# Patient Record
Sex: MALE | Race: WHITE | Employment: OTHER | URBAN - NONMETROPOLITAN AREA
[De-identification: names, ages, dates, MRNs, and addresses within clinical notes are randomized per-mention and may not be internally consistent; named-entity substitution may affect disease eponyms.]

---

## 2017-10-14 ENCOUNTER — OFFICE VISIT (OUTPATIENT)
Dept: PRIMARY CARE CLINIC | Age: 52
End: 2017-10-14
Payer: MEDICARE

## 2017-10-14 VITALS
OXYGEN SATURATION: 97 % | DIASTOLIC BLOOD PRESSURE: 90 MMHG | WEIGHT: 315 LBS | RESPIRATION RATE: 16 BRPM | HEART RATE: 100 BPM | BODY MASS INDEX: 51.16 KG/M2 | TEMPERATURE: 98.5 F | SYSTOLIC BLOOD PRESSURE: 150 MMHG

## 2017-10-14 DIAGNOSIS — H66.002 ACUTE SUPPURATIVE OTITIS MEDIA OF LEFT EAR WITHOUT SPONTANEOUS RUPTURE OF TYMPANIC MEMBRANE, RECURRENCE NOT SPECIFIED: Primary | ICD-10-CM

## 2017-10-14 DIAGNOSIS — H60.393 OTHER INFECTIVE ACUTE OTITIS EXTERNA OF BOTH EARS: ICD-10-CM

## 2017-10-14 PROCEDURE — G8419 CALC BMI OUT NRM PARAM NOF/U: HCPCS | Performed by: NURSE PRACTITIONER

## 2017-10-14 PROCEDURE — 4130F TOPICAL PREP RX AOE: CPT | Performed by: NURSE PRACTITIONER

## 2017-10-14 PROCEDURE — G8427 DOCREV CUR MEDS BY ELIG CLIN: HCPCS | Performed by: NURSE PRACTITIONER

## 2017-10-14 PROCEDURE — 3017F COLORECTAL CA SCREEN DOC REV: CPT | Performed by: NURSE PRACTITIONER

## 2017-10-14 PROCEDURE — 4004F PT TOBACCO SCREEN RCVD TLK: CPT | Performed by: NURSE PRACTITIONER

## 2017-10-14 PROCEDURE — G8484 FLU IMMUNIZE NO ADMIN: HCPCS | Performed by: NURSE PRACTITIONER

## 2017-10-14 PROCEDURE — 99213 OFFICE O/P EST LOW 20 MIN: CPT | Performed by: NURSE PRACTITIONER

## 2017-10-14 RX ORDER — FENOFIBRATE 145 MG/1
TABLET, COATED ORAL
Refills: 3 | COMMUNITY
Start: 2017-10-04

## 2017-10-14 RX ORDER — TIZANIDINE 4 MG/1
TABLET ORAL
Refills: 3 | COMMUNITY
Start: 2017-10-04 | End: 2021-08-20 | Stop reason: ALTCHOICE

## 2017-10-14 RX ORDER — TRAMADOL HYDROCHLORIDE 50 MG/1
TABLET ORAL
Refills: 2 | COMMUNITY
Start: 2017-10-05

## 2017-10-14 RX ORDER — GABAPENTIN 100 MG/1
200 CAPSULE ORAL
COMMUNITY

## 2017-10-14 RX ORDER — TAMSULOSIN HYDROCHLORIDE 0.4 MG/1
CAPSULE ORAL
Refills: 5 | COMMUNITY
Start: 2017-10-04 | End: 2021-08-20 | Stop reason: ALTCHOICE

## 2017-10-14 RX ORDER — CYCLOBENZAPRINE HCL 5 MG
5 TABLET ORAL
COMMUNITY
Start: 2015-10-15 | End: 2021-08-20 | Stop reason: ALTCHOICE

## 2017-10-14 RX ORDER — TIOTROPIUM BROMIDE INHALATION SPRAY 3.12 UG/1
SPRAY, METERED RESPIRATORY (INHALATION)
Refills: 2 | COMMUNITY
Start: 2017-09-07

## 2017-10-14 RX ORDER — BUPROPION HYDROCHLORIDE 150 MG/1
150 TABLET, EXTENDED RELEASE ORAL
COMMUNITY

## 2017-10-14 RX ORDER — BUDESONIDE AND FORMOTEROL FUMARATE DIHYDRATE 160; 4.5 UG/1; UG/1
2 AEROSOL RESPIRATORY (INHALATION)
COMMUNITY

## 2017-10-14 RX ORDER — DOXYCYCLINE 100 MG/1
100 CAPSULE ORAL 2 TIMES DAILY
Qty: 14 CAPSULE | Refills: 0 | Status: SHIPPED | OUTPATIENT
Start: 2017-10-14 | End: 2017-10-21

## 2017-10-14 RX ORDER — OXYCODONE HYDROCHLORIDE AND ACETAMINOPHEN 5; 325 MG/1; MG/1
TABLET ORAL
Refills: 0 | COMMUNITY
Start: 2017-09-20

## 2017-10-14 RX ORDER — PREDNISONE 20 MG/1
TABLET ORAL
Refills: 0 | COMMUNITY
Start: 2017-09-25 | End: 2021-08-20 | Stop reason: ALTCHOICE

## 2017-10-14 RX ORDER — OFLOXACIN 3 MG/ML
10 SOLUTION AURICULAR (OTIC) 2 TIMES DAILY
Qty: 10 ML | Refills: 0 | Status: SHIPPED | OUTPATIENT
Start: 2017-10-14 | End: 2017-10-21

## 2017-10-14 ASSESSMENT — ENCOUNTER SYMPTOMS
SHORTNESS OF BREATH: 1
DIARRHEA: 0
SORE THROAT: 1
WHEEZING: 1
VOMITING: 0
COUGH: 1
RHINORRHEA: 1

## 2017-10-14 NOTE — PROGRESS NOTES
[address] [facility] WALK-IN [facility] [place] [id number]  Dept: [phone]  Dept Fax: [phone]    Karn Mohs is a 46 y.o. male who presents to the [facility] in Care today for his medical conditions/complaints as noted below. Karn Mohs is c/o of Otalgia and Cough      HPI:     Otalgia    There is pain in the left ear. This is a new problem. The current episode started yesterday. The problem occurs constantly. The problem has been gradually worsening. There has been no fever. The pain is at a severity of 8/10. The pain is moderate. Associated symptoms include coughing, headaches, rhinorrhea and a sore throat. Pertinent negatives include no diarrhea or vomiting. He has tried nothing for the symptoms. The treatment provided no relief. His past medical history is significant for hearing loss (little). There is no history of a chronic ear infection or a tympanostomy tube. Cough   This is a new problem. The problem has been gradually worsening. The cough is productive of sputum (thick yellow). Associated symptoms include ear pain, headaches, nasal congestion, rhinorrhea, a sore throat, shortness of breath, sweats and wheezing. Pertinent negatives include no chills or fever. Nothing aggravates the symptoms. He has tried a beta-agonist inhaler for the symptoms. His past medical history is significant for asthma, bronchitis and COPD. There is no history of bronchiectasis, emphysema, environmental allergies or pneumonia.        Past Medical History:   Diagnosis Date    ASHANTI (acute kidney injury) ([facility] [address] 5/20/2014    COPD (chronic obstructive pulmonary disease) ([facility] 5/26/2014        Current Outpatient Prescriptions   Medication Sig Dispense Refill    budesonide-formoterol (SYMBICORT) 160-4.5 MCG/ACT AERO Inhale 2 puffs into the lungs      buPROPion (WELLBUTRIN SR) 150 MG extended release tablet Take 150 mg by mouth      cyclobenzaprine (FLEXERIL) 5 MG tablet Take 5 mg by mouth      gabapentin (NEURONTIN) 100 MG capsule Take 200 mg by mouth      fenofibrate (TRICOR) 145 MG tablet TAKE 1 TABLET BY MOUTH EVERY DAY  3    metFORMIN (GLUCOPHAGE) 500 MG tablet TAKE 1 TABLET BY MOUTH TWICE DAILY WITH MEALS  5    oxyCODONE-acetaminophen (PERCOCET) 5-325 MG per tablet TAKE 1 TABLET BY MOUTH TWICE DAILY AS NEEDED.  0    predniSONE (DELTASONE) 20 MG tablet TAKE 3 TABLETS BY MOUTH EVERY DAY FOR 5 DAYS  0    tamsulosin (FLOMAX) 0.4 MG capsule TAKE 2 CAPSULES BY MOUTH EVERY DAY  5    SPIRIVA RESPIMAT 2.5 MCG/ACT AERS inhaler INHALE 2 (TWO) puffs ONCE DAILY  2    tiZANidine (ZANAFLEX) 4 MG tablet take 2 tablets in the morning,1 tablet at noon, and 2 tablets at bedtime  3    traMADol (ULTRAM) 50 MG tablet TAKE 1 TO 2 TABLETS BY MOUTH THREE TIMES DAILY AS NEEDED FOR PAIN.  2    ofloxacin (FLOXIN) 0.3 % otic solution Place 10 drops into both ears 2 times daily for 7 days 10 mL 0    doxycycline monohydrate (MONODOX) 100 MG capsule Take 1 capsule by mouth 2 times daily for 7 days 14 capsule 0    albuterol (PROVENTIL HFA) 108 (90 BASE) MCG/ACT inhaler Inhale 2 puffs into the lungs every 6 hours as needed for Wheezing. 1 Inhaler 1    aspirin 81 MG EC tablet Take 1 tablet by mouth daily. 30 tablet 3    amiodarone (CORDARONE) 200 MG tablet Take 1 tablet by mouth daily. 30 tablet 3    insulin glargine (LANTUS) 100 UNIT/ML injection Inject 5 Units into the skin nightly. 1 Pen 2    insulin lispro (HUMALOG) 100 UNIT/ML injection Inject 0-18 Units into the skin 3 times daily (with meals). 1 Pen 2    insulin lispro (HUMALOG) 100 UNIT/ML injection Inject 0-9 Units into the skin nightly. 1 Pen 2    atorvastatin (LIPITOR) 40 MG tablet Take 1 tablet by mouth nightly. 30 tablet 3    lisinopril (PRINIVIL;ZESTRIL) 20 MG tablet Take 1 tablet by mouth daily. 30 tablet 2    metoprolol (LOPRESSOR) 100 MG tablet Take 1 tablet by mouth 2 times daily.  60 tablet 2    furosemide (LASIX) 40 MG tablet Take 1 tablet by mouth daily. 60 tablet 2    amiodarone (PACERONE) 400 MG tablet Take 1 tablet by mouth 2 times daily for 3 days. 2 tablet 0    amiodarone (PACERONE) 400 MG tablet Take 1 tablet by mouth daily for 3 days. 3 tablet 0    rivaroxaban (XARELTO) 15 MG TABS tablet Take 1 tablet by mouth 2 times daily (with meals) for 21 days. 32 tablet 0    rivaroxaban (XARELTO) 20 MG TABS tablet Take 1 tablet by mouth Daily for 68 days. 68 tablet 0     No current facility-administered medications for this visit. Allergies   Allergen Reactions    Penicillins Hives    Vancomycin Rash     Had rash after many days of vanco, but was also on pain meds, rash was mild and macular over chest and all the back, noted afternoon 5/28/14 and vanco switched and pt left to NH- pain meds were not addressed       Subjective:      Review of Systems   Constitutional: Positive for diaphoresis. Negative for appetite change, chills, fatigue and fever. HENT: Positive for congestion, ear pain, rhinorrhea and sore throat. Respiratory: Positive for cough, shortness of breath and wheezing. Gastrointestinal: Negative for diarrhea and vomiting. Allergic/Immunologic: Negative for environmental allergies. Neurological: Positive for headaches. Objective:     Physical Exam   Constitutional: He is oriented to person, place, and time. He appears well-developed and well-nourished. He is cooperative. He does not appear ill. No distress. HENT:   Head: Normocephalic and atraumatic. Right Ear: Hearing and ear canal normal. There is swelling (mild edema with erythema to external auditory canal) and tenderness. No drainage. No mastoid tenderness. Tympanic membrane is not injected, not erythematous and not bulging. No middle ear effusion. Left Ear: Hearing and ear canal normal. There is swelling (mild edema with erythema and scant white debris to external auditory canal) and tenderness. No drainage. No mastoid tenderness.  Tympanic difficulty breathing, shortness of breath, inability to swallow, hives, rash, facial/tongue swelling or temp greater than 103 degrees. · Follow up as needed with PCP or Walk in Care if symptoms worsen or do not improve      Tigist Blunt received counseling on the following healthy behaviors: medication adherence. Patient given educational materials - see patient instructions. Discussed use, benefit, and side effects of prescribed medications. Treatment plan discussed at visit. Continue routine health care follow up. All patient questions answered. Pt voiced understanding.       Electronically signed by Malena Bonilla CNP on 10/14/2017 at 9:57 AM

## 2017-10-14 NOTE — PATIENT INSTRUCTIONS
doxycycline  Pronunciation:  DOX ray azul  Brand:  Acticlate, Adoxa, Alodox, Avidoxy, Doryx, Mondoxyne NL, Monodox, Morgidox, Genuine Parts, [place], Gaona, Targadox, Vibramycin  What is the most important information I should know about doxycycline? You should not take this medicine if you are allergic to any tetracycline antibiotic. Children younger than 6years old should use doxycycline only in cases of severe or life-threatening conditions. This medicine can cause permanent yellowing or graying of the teeth in children  Using doxycycline during pregnancy could harm the unborn baby or cause permanent tooth discoloration later in the baby's life. What is doxycycline? Doxycycline is a tetracycline antibiotic that fights bacteria in the body. Doxycycline is used to treat many different bacterial infections, such as acne, urinary tract infections, intestinal infections, eye infections, gonorrhea, chlamydia, periodontitis (gum disease), and others. Doxycycline is also used to treat blemishes, bumps, and acne-like lesions caused by rosacea. Doxycycline will not treat facial redness caused by rosacea. Some forms of doxycycline are used to prevent malaria, to treat anthrax, or to treat infections caused by mites, ticks, or lice. Doxycycline may also be used for purposes not listed in this medication guide. What should I discuss with my healthcare provider before taking doxycycline? You should not take this medicine if you are allergic to doxycycline or other tetracycline antibiotics such as demeclocycline, minocycline, tetracycline, or tigecycline.   To make sure doxycycline is safe for you, tell your doctor if you have:  · liver disease;  · kidney disease;  · asthma or sulfite allergy;  · a history of increased pressure inside your skull;  · if you also take isotretinoin (Amnesteem, Claravis, Sotret); or  · if you take seizure medicine (carbamazepine, phenobarbital, phenytoin), or a blood others may occur. Call your doctor for medical advice about side effects. You may report side effects to FDA at 8-208-FSX-0005. What other drugs will affect doxycycline? Other drugs may interact with doxycycline, including prescription and over-the-counter medicines, vitamins, and herbal products. Tell each of your health care providers about all medicines you use now and any medicine you start or stop using. Where can I get more information? Your pharmacist can provide more information about doxycycline. Remember, keep this and all other medicines out of the reach of children, never share your medicines with others, and use this medication only for the indication prescribed. Every effort has been made to ensure that the information provided by [address] is accurate, up-to-date, and complete, but no guarantee is made to that effect. Drug information contained herein may be time sensitive. [employer] information has been compiled for use by healthcare practitioners and consumers in the [place] and therefore [employer] does not warrant that uses outside of the [place] are appropriate, unless specifically indicated otherwise. [facility] drug information does not endorse drugs, diagnose patients or recommend therapy. [facility] [employer] drug information is an informational resource designed to assist licensed healthcare practitioners in caring for their patients and/or to serve consumers viewing this service as a supplement to, and not a substitute for, the expertise, skill, knowledge and judgment of healthcare practitioners. The absence of a warning for a given drug or drug combination in no way should be construed to indicate that the drug or drug combination is safe, effective or appropriate for any given patient. [employer] does not assume any responsibility for any aspect of healthcare administered with the aid of information [facility] provides.  The information contained herein is not intended to cover away from moisture, heat, and light. Throw away any unused medicine after your treatment is finished. What happens if I miss a dose? Use the missed dose as soon as you remember. Skip the missed dose if it is almost time for your next scheduled dose. Do not use extra medicine to make up the missed dose. What happens if I overdose? An overdose of this medicine is not expected to be dangerous. Seek emergency medical attention or call the Poison Help line at 1-588.333.3460 if anyone has accidentally swallowed the medication. What should I avoid while taking ofloxacin otic? This medicine is for use only in the ears. Avoid getting the medicine in your eyes, mouth, and nose, or on your lips. Rinse with water if this medicine gets in or on these areas. Do not use other ear medications unless your doctor tells you to. What are the possible side effects of ofloxacin otic? Get emergency medical help if you have any of these signs of an allergic reaction: hives, rash, itching; slow heart rate, weak pulse, fainting; difficult breathing, slow breathing (breathing may stop); swelling of your face, lips, tongue, or throat. Stop using this medicine and call your doctor at once if you have:  · the first sign of any skin rash, no matter how mild; or  · ear drainage, discharge, or worsening pain. Common side effects may include:  · headache;  · dizziness; or  · mild ear pain or itching after using the ear drops. This is not a complete list of side effects and others may occur. Call your doctor for medical advice about side effects. You may report side effects to FDA at 2-383-VFA-9056. What other drugs will affect ofloxacin otic? It is not likely that other drugs you take orally or inject will have an effect on ofloxacin used in the ears. But many drugs can interact with each other.  Tell each of your healthcare providers about all medicines you use, including prescription and over-the-counter medicines, vitamins, and herbal products. Where can I get more information? Your pharmacist can provide more information about ofloxacin otic. Remember, keep this and all other medicines out of the reach of children, never share your medicines with others, and use this medication only for the indication prescribed. Every effort has been made to ensure that the information provided by [facility] is accurate, up-to-date, and complete, but no guarantee is made to that effect. Drug information contained herein may be time sensitive. [facility] information has been compiled for use by healthcare practitioners and consumers in the [place] and therefore [facility] does not warrant that uses outside of the [place] are appropriate, unless specifically indicated otherwise. [facility]'s drug information does not endorse drugs, diagnose patients or recommend therapy. [facility]'s drug information is an informational resource designed to assist licensed healthcare practitioners in caring for their patients and/or to serve consumers viewing this service as a supplement to, and not a substitute for, the expertise, skill, knowledge and judgment of healthcare practitioners. The absence of a warning for a given drug or drug combination in no way should be construed to indicate that the drug or drug combination is safe, effective or appropriate for any given patient. [facility] does not assume any responsibility for any aspect of healthcare administered with the aid of information [facility] provides. The information contained herein is not intended to cover all possible uses, directions, precautions, warnings, drug interactions, allergic reactions, or adverse effects. If you have questions about the drugs you are taking, check with your doctor, nurse or pharmacist.  Copyright 7797-5542 Ana Cristina [address] [place] [address] Avenue: 2.01. Revision date: 6/6/2014. Care instructions adapted under license by [facility] ([facility]).  If you have questions about a medical condition or this instruction, always ask your healthcare professional. [address] any warranty or liability for your use of this information. Swimmer's Ear: Care Instructions  Your Care Instructions    Swimmer's ear (otitis externa) is inflammation or infection of the ear canal. This is the passage that leads from the outer ear to the eardrum. Any water, sand, or other debris that gets into the ear canal and stays there can cause swimmer's ear. Putting cotton swabs or other items in the ear to clean it can also cause this problem. Swimmer's ear can be very painful. But you can treat the pain and infection with medicines. You should feel better in a few days. Follow-up care is a key part of your treatment and safety. Be sure to make and go to all appointments, and call your doctor if you are having problems. It's also a good idea to know your test results and keep a list of the medicines you take. How can you care for yourself at home? Cleaning and care  · Use antibiotic drops as your doctor directs. · Do not insert ear drops (other than the antibiotic ear drops) or anything else into the ear unless your doctor has told you to. · Avoid getting water in the ear until the problem clears up. Use cotton lightly coated with petroleum jelly as an earplug. Do not use plastic earplugs. · Use a hair dryer set on low to carefully dry the ear after you shower. · To ease ear pain, hold a warm washcloth against your ear. · Take pain medicines exactly as directed. ¨ If the doctor gave you a prescription medicine for pain, take it as prescribed. ¨ If you are not taking a prescription pain medicine, ask your doctor if you can take an over-the-counter medicine. Inserting ear drops  · Warm the drops to body temperature by rolling the container in your hands. Or you can place it in a cup of warm water for a few minutes. · Lie down, with your ear facing up. · Place drops inside the ear. problems. It's also a good idea to know your test results and keep a list of the medicines you take. How can you care for yourself at home? · Take pain medicines exactly as directed. ¨ If the doctor gave you a prescription medicine for pain, take it as prescribed. ¨ If you are not taking a prescription pain medicine, take an over-the-counter medicine, such as acetaminophen (Tylenol), ibuprofen (Advil, Motrin), or naproxen (Aleve). Read and follow all instructions on the label. ¨ Do not take two or more pain medicines at the same time unless the doctor told you to. Many pain medicines have acetaminophen, which is Tylenol. Too much acetaminophen (Tylenol) can be harmful. · Plan to take a full dose of pain reliever before bedtime. Getting enough sleep will help you get better. · Try a warm, moist washcloth on the ear. It may help relieve pain. · If your doctor prescribed antibiotics, take them as directed. Do not stop taking them just because you feel better. You need to take the full course of antibiotics. When should you call for help? Call your doctor now or seek immediate medical care if:  · You have new or increasing ear pain. · You have new or increasing pus or blood draining from your ear. · You have a fever with a stiff neck or a severe headache. Watch closely for changes in your health, and be sure to contact your doctor if:  · You have new or worse symptoms. · You are not getting better after taking an antibiotic for 2 days. Where can you learn more? Go to [email or web address].[email or web address] org and sign in to your [employer] account. Enter V427 in the [employer] box to learn more about \"Ear Infection (Otitis Media): Care Instructions. \"     If you do not have an account, please click on the \"Sign Up Now\" link. Current as of: May 4, 2017  Content Version: 11.3  © 2329-7718 [employer]. Care instructions adapted under license by [facility] ([facility]).  If you have questions about

## 2019-04-29 RX ORDER — FUROSEMIDE 20 MG/1
TABLET ORAL
Qty: 30 TABLET | OUTPATIENT
Start: 2019-04-29

## 2021-08-20 ENCOUNTER — HOSPITAL ENCOUNTER (EMERGENCY)
Age: 56
Discharge: HOME | End: 2021-08-20
Attending: FAMILY MEDICINE
Payer: MEDICARE

## 2021-08-20 ENCOUNTER — APPOINTMENT (OUTPATIENT)
Dept: GENERAL RADIOLOGY | Age: 56
End: 2021-08-20
Payer: MEDICARE

## 2021-08-20 VITALS
HEIGHT: 73 IN | OXYGEN SATURATION: 95 % | DIASTOLIC BLOOD PRESSURE: 96 MMHG | HEART RATE: 91 BPM | SYSTOLIC BLOOD PRESSURE: 141 MMHG | WEIGHT: 310 LBS | RESPIRATION RATE: 22 BRPM | BODY MASS INDEX: 41.08 KG/M2

## 2021-08-20 DIAGNOSIS — T07.XXXA ABRASIONS OF MULTIPLE SITES: ICD-10-CM

## 2021-08-20 DIAGNOSIS — M25.511 ACUTE PAIN OF RIGHT SHOULDER: Primary | ICD-10-CM

## 2021-08-20 DIAGNOSIS — M25.521 RIGHT ELBOW PAIN: ICD-10-CM
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
## 2021-08-20 PROCEDURE — 73030 X-RAY EXAM OF SHOULDER: CPT

## 2021-08-20 PROCEDURE — 73080 X-RAY EXAM OF ELBOW: CPT

## 2021-08-20 PROCEDURE — 6370000000 HC RX 637 (ALT 250 FOR IP): Performed by: FAMILY MEDICINE

## 2021-08-20 PROCEDURE — 99285 EMERGENCY DEPT VISIT HI MDM: CPT

## 2021-08-20 RX ORDER — LIDOCAINE 4 G/G
1 PATCH TOPICAL ONCE
Status: DISCONTINUED | OUTPATIENT
Start: 2021-08-20 | End: 2021-08-20 | Stop reason: HOSPADM

## 2021-08-20 RX ORDER — OXYCODONE HYDROCHLORIDE 5 MG/1
5 TABLET ORAL ONCE
Status: COMPLETED | OUTPATIENT
Start: 2021-08-20 | End: 2021-08-20

## 2021-08-20 RX ORDER — CYCLOBENZAPRINE HCL 10 MG
10 TABLET ORAL 3 TIMES DAILY PRN
Qty: 21 TABLET | Refills: 0 | Status: SHIPPED | OUTPATIENT
Start: 2021-08-20 | End: 2021-08-30

## 2021-08-20 RX ORDER — OXYCODONE HYDROCHLORIDE AND ACETAMINOPHEN 5; 325 MG/1; MG/1
1 TABLET ORAL EVERY 6 HOURS PRN
Qty: 12 TABLET | Refills: 0 | Status: SHIPPED | OUTPATIENT
Start: 2021-08-20 | End: 2021-08-23

## 2021-08-20 RX ORDER — ACETAMINOPHEN 500 MG
1000 TABLET ORAL ONCE
Status: COMPLETED | OUTPATIENT
Start: 2021-08-20 | End: 2021-08-20

## 2021-08-20 RX ORDER — HYDROCODONE BITARTRATE AND ACETAMINOPHEN 5; 325 MG/1; MG/1
1 TABLET ORAL ONCE
Status: DISCONTINUED | OUTPATIENT
Start: 2021-08-20 | End: 2021-08-20

## 2021-08-20 RX ADMIN — ACETAMINOPHEN 1000 MG: 500 TABLET, FILM COATED ORAL at 17:13

## 2021-08-20 RX ADMIN — OXYCODONE HYDROCHLORIDE 5 MG: 5 TABLET ORAL at 18:23

## 2021-08-20 ASSESSMENT — PAIN SCALES - GENERAL
PAINLEVEL_OUTOF10: 6
PAINLEVEL_OUTOF10: 5
PAINLEVEL_OUTOF10: 5

## 2021-08-20 ASSESSMENT — PAIN DESCRIPTION - PROGRESSION
CLINICAL_PROGRESSION: GRADUALLY IMPROVING
CLINICAL_PROGRESSION: NOT CHANGED

## 2021-08-20 ASSESSMENT — PAIN DESCRIPTION - PAIN TYPE
TYPE: ACUTE PAIN
TYPE: ACUTE PAIN

## 2021-08-20 ASSESSMENT — PAIN DESCRIPTION - LOCATION
LOCATION: SHOULDER
LOCATION: SHOULDER

## 2021-08-20 ASSESSMENT — PAIN DESCRIPTION - DESCRIPTORS
DESCRIPTORS: THROBBING
DESCRIPTORS: ACHING

## 2021-08-20 ASSESSMENT — PAIN DESCRIPTION - ORIENTATION
ORIENTATION: RIGHT
ORIENTATION: RIGHT

## 2021-08-20 ASSESSMENT — PAIN DESCRIPTION - ONSET
ONSET: SUDDEN
ONSET: SUDDEN

## 2021-08-20 ASSESSMENT — PAIN DESCRIPTION - FREQUENCY
FREQUENCY: CONTINUOUS
FREQUENCY: CONTINUOUS

## 2021-08-20 NOTE — ED NOTES
Patient refused Gage Kayser, states would like to have acetaminophen instead.  Will notify Dr. Pramod Jameson RN  08/20/21 0828
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
## 2022-12-24 ENCOUNTER — HOSPITAL ENCOUNTER (EMERGENCY)
Age: 57
Discharge: HOME | End: 2022-12-24
Attending: FAMILY MEDICINE
Payer: COMMERCIAL

## 2022-12-24 VITALS
DIASTOLIC BLOOD PRESSURE: 83 MMHG | OXYGEN SATURATION: 95 % | TEMPERATURE: 97.9 F | RESPIRATION RATE: 20 BRPM | SYSTOLIC BLOOD PRESSURE: 121 MMHG | BODY MASS INDEX: 28.44 KG/M2 | HEIGHT: 72 IN | WEIGHT: 210 LBS | HEART RATE: 117 BPM

## 2022-12-24 DIAGNOSIS — K59.00 CONSTIPATION, UNSPECIFIED CONSTIPATION TYPE: Primary | ICD-10-CM

## 2022-12-24 LAB
CHP ED QC CHECK: YES
HEMOCCULT STL QL: POSITIVE

## 2022-12-24 PROCEDURE — 99283 EMERGENCY DEPT VISIT LOW MDM: CPT

## 2022-12-24 PROCEDURE — 6370000000 HC RX 637 (ALT 250 FOR IP): Performed by: FAMILY MEDICINE

## 2022-12-24 RX ORDER — SODIUM PHOSPHATE,MONO-DIBASIC 19G-7G/118
1 ENEMA (ML) RECTAL ONCE
Status: COMPLETED | OUTPATIENT
Start: 2022-12-24 | End: 2022-12-24

## 2022-12-24 RX ORDER — LACTULOSE 10 G/15ML
10 SOLUTION ORAL; RECTAL 2 TIMES DAILY PRN
Qty: 150 ML | Refills: 0 | Status: SHIPPED | OUTPATIENT
Start: 2022-12-24

## 2022-12-24 RX ADMIN — SODIUM PHOSPHATE 1 ENEMA: 7; 19 ENEMA RECTAL at 19:25

## 2022-12-24 ASSESSMENT — PAIN SCALES - GENERAL: PAINLEVEL_OUTOF10: 0

## 2022-12-24 ASSESSMENT — PAIN - FUNCTIONAL ASSESSMENT: PAIN_FUNCTIONAL_ASSESSMENT: NONE - DENIES PAIN

## 2022-12-25 ASSESSMENT — ENCOUNTER SYMPTOMS: CONSTIPATION: 1

## 2022-12-25 NOTE — ED PROVIDER NOTES
[address] [facility]  eMERGENCY dEPARTMENT eNCOUnter          CHIEF COMPLAINT       Chief Complaint   Patient presents with    Constipation     Started today       Nurses Notes reviewed and I agree except as noted in the HPI. HISTORY OF PRESENT ILLNESS    Chicho Dave is a 62 y.o. male who presents to the emergency room via private vehicle, patient complaining of constipation, and has had a little rectal bleeding, states he has been having difficulty passing any stool today, he had states yesterday he discussed a very small amount of very hard stool. Patient noted blood per rectum today, describing bright red blood. Patient states has been off his blood thinner for few months now. Patient denies any trauma or falls, denies any anal penetrative activities. REVIEW OF SYSTEMS     Review of Systems   Gastrointestinal:  Positive for constipation. All other systems reviewed and are negative. PAST MEDICAL HISTORY    has a past medical history of ASHANTI (acute kidney injury) ([facility] [address] COPD (chronic obstructive pulmonary disease) ([facility] [address] and Hypertension. SURGICAL HISTORY      has a past surgical history that includes Appendectomy and Cervical spine surgery.     CURRENT MEDICATIONS       Discharge Medication List as of 12/24/2022  7:52 PM        CONTINUE these medications which have NOT CHANGED    Details   budesonide-formoterol (SYMBICORT) 160-4.5 MCG/ACT AERO Inhale 2 puffs into the lungsHistorical Med      buPROPion (WELLBUTRIN SR) 150 MG extended release tablet Take 150 mg by mouthHistorical Med      gabapentin (NEURONTIN) 100 MG capsule Take 200 mg by mouthHistorical Med      fenofibrate (TRICOR) 145 MG tablet TAKE 1 TABLET BY MOUTH EVERY DAY, R-3Historical Med      metFORMIN (GLUCOPHAGE) 500 MG tablet TAKE 1 TABLET BY MOUTH TWICE DAILY WITH MEALS, R-5Historical Med      oxyCODONE-acetaminophen (PERCOCET) 5-325 MG per tablet TAKE 1 TABLET BY MOUTH TWICE DAILY AS NEEDED., R-0Historical Med      SPIRIVA RESPIMAT 2.5 MCG/ACT AERS inhaler INHALE 2 (TWO) puffs ONCE DAILY, R-2, DAWHistorical Med      traMADol (ULTRAM) 50 MG tablet TAKE 1 TO 2 TABLETS BY MOUTH THREE TIMES DAILY AS NEEDED FOR PAIN., R-2Historical Med      albuterol (PROVENTIL HFA) 108 (90 BASE) MCG/ACT inhaler Inhale 2 puffs into the lungs every 6 hours as needed for Wheezing., Disp-1 Inhaler, R-1Print      aspirin 81 MG EC tablet Take 1 tablet by mouth daily. , Disp-30 tablet, R-3Print      amiodarone (CORDARONE) 200 MG tablet Take 1 tablet by mouth daily. , Disp-30 tablet, R-3Print      rivaroxaban (XARELTO) 15 MG TABS tablet Take 1 tablet by mouth 2 times daily (with meals) for 21 days. , Disp-32 tablet, R-0Print      rivaroxaban (XARELTO) 20 MG TABS tablet Take 1 tablet by mouth Daily for 68 days. , Disp-68 tablet, R-0Print      insulin glargine (LANTUS) 100 UNIT/ML injection Inject 5 Units into the skin nightly., Disp-1 Pen, R-2Print      !! insulin lispro (HUMALOG) 100 UNIT/ML injection Inject 0-18 Units into the skin 3 times daily (with meals). , Disp-1 Pen, R-2Print      !! insulin lispro (HUMALOG) 100 UNIT/ML injection Inject 0-9 Units into the skin nightly., Disp-1 Pen, R-2Print      atorvastatin (LIPITOR) 40 MG tablet Take 1 tablet by mouth nightly., Disp-30 tablet, R-3Print      lisinopril (PRINIVIL;ZESTRIL) 20 MG tablet Take 1 tablet by mouth daily. , Disp-30 tablet, R-2Print      metoprolol (LOPRESSOR) 100 MG tablet Take 1 tablet by mouth 2 times daily. , Disp-60 tablet, R-2Print      furosemide (LASIX) 40 MG tablet Take 1 tablet by mouth daily. , Disp-60 tablet, R-2Print       !! - Potential duplicate medications found. Please discuss with provider. ALLERGIES     is allergic to amoxicillin, penicillins, and vancomycin. FAMILY HISTORY     has no family status information on file. family history is not on file. SOCIAL HISTORY      reports that he quit smoking about 6 years ago. His smoking use included cigarettes. He smoked an average of 1 pack per day. He uses smokeless tobacco. He reports current alcohol use of about 6.0 standard drinks per week. He reports that he does not use drugs. PHYSICAL EXAM     INITIAL VITALS:  height is 6' (1.829 m) and weight is 210 lb (95.3 kg). His oral temperature is 97.9 °F (36.6 °C). His blood pressure is 121/83 and his pulse is 117 (abnormal). His respiration is 20 and oxygen saturation is 95%. Physical Exam   Constitutional: Patient is oriented to person, place, and time. Patient appears well-developed and well-nourished. Patient is active and cooperative. HENT:   Head: Normocephalic and atraumatic. Head is without contusion. Right Ear: Hearing and external ear normal. No drainage. Left Ear: Hearing and external ear normal. No drainage. Nose: Nose normal. No nasal deformity. No epistaxis. Mouth/Throat: Mucous membranes are not dry. Eyes: EOMI. Conjunctivae, sclera, and lids are normal. Right eye exhibits no discharge. Left eye exhibits no discharge. Neck: Full passive range of motion without pain and phonation normal.   Cardiovascular:  Normal rate, regular rhythm and intact distal pulses. Pulses: Right radial pulse  2+   Pulmonary/Chest: Effort normal. No tachypnea and no bradypnea. No wheezes, rhonchi, or rales. Abdominal: BMI 28.4, soft. Patient without distension or tenderness  Rectal: Noted to nonthrombosed external hemorrhoids, no abrasions or fissures, there is some blood product in the perianal opening, SHAKIRA was grossly bloody with a mucousy blood type product, hard stool noted in the rectal vault, and was felt to be 1 internal hemorrhoid, no other masses appreciated. Fecal occult blood card was positive with normal controls  Musculoskeletal:   Negative acute trauma or deformity,  apparent full range of motion and normal strength all extremities appropriate to age. Neurological: Patient is alert and oriented to person, place, and time.  patient displays no tremor. Patient displays no seizure activity. .    Skin: Skin is warm and dry. Patient is not diaphoretic. Psychiatric: Patient has a normal mood and affable affect. Patient speech is normal and behavior is normal. Cognition and memory are normal.     DIFFERENTIAL DIAGNOSIS:   Constipation, hemorrhoids    DIAGNOSTIC RESULTS         RADIOLOGY: non-plain film images(s) such as CT, Ultrasound and MRI are read by the radiologist.  No orders to display       LABS:   Labs Reviewed   POCT OCCULT BLOOD STOOL NON CA SCREEN - Normal       EMERGENCY DEPARTMENT COURSE:   Vitals:    Vitals:    12/24/22 1822 12/24/22 1825   BP: 121/83    Pulse: (!) 117    Resp: 20    Temp: 97.9 °F (36.6 °C)    TempSrc: Oral    SpO2: 95%    Weight:  210 lb (95.3 kg)   Height:  6' (1.829 m)     Patient history and physical exam taken at bedside, discussed patient symptoms and exam findings, discussed performing focused rectal exam, will perform with nursing in the room, patient acknowledged. Rectal exam performed with DEBORAH Maldonado present    Discussed with patient rectal exam findings, discussed bleeding is likely secondary to an internal hemorrhoid, patient does asked due to his constipation as he did try small amount of magnesium citrate, we discussed possibilities including continue with his magnesium citrate, MiraLAX/Colace, lactulose, patient has other cellular media, I did discuss possibly using a fleets enema or suppository, as patient wants to quit relief will give fleets enema in the ER.     Prior to receiving fleets enema, patient to try to go to bathroom twice, the latter time with bowel movement affect, this time we will go ahead and set discharge patient home, will prescribe lactulose, will send home with a fleets that already been pulled from stock with nursing giving improved description how to use the product at home, patient advised to stay well-hydrated, slowly add fiber to his diet, follow-up with primary care, acknowledged    FINAL IMPRESSION      1. Constipation, unspecified constipation type          DISPOSITION/PLAN   Discharge    PATIENT REFERRED TO:  Fadi Gómez            DISCHARGE MEDICATIONS:  Discharge Medication List as of 12/24/2022  7:52 PM        START taking these medications    Details   lactulose encephalopathy (GENERLAC) 10 GM/15ML SOLN solution Take 15 mLs by mouth 2 times daily as needed (constipation), Disp-150 mL, R-0Normal                 Summation      Patient Course: Discharge    ED Medications administered this visit:    Medications   sodium phosphate (FLEET) enema 1 enema (1 enema Rectal Given 12/24/22 1925)       New Prescriptions from this visit:    Discharge Medication List as of 12/24/2022  7:52 PM        START taking these medications    Details   lactulose encephalopathy (GENERLAC) 10 GM/15ML SOLN solution Take 15 mLs by mouth 2 times daily as needed (constipation), Disp-150 mL, R-0Normal             Follow-up:  Fadi Gómez              Final Impression:   1.  Constipation, unspecified constipation type               (Please note that portions of this note were completed with a voice recognition program.  Efforts were made to edit the dictations but occasionally words are mis-transcribed.)    MD Peewee Marrero MD  12/25/22 9952

## 2022-12-25 NOTE — ED NOTES
Patient helped back from bathroom at this time. Patient requesting a male nurse to help with enema. Patient states that he is unable to perform fleets enema on oneself if he is given the proper instruction by nurse. Nurse supervisor, Dillon Kumar, notified of patient request and Dr Carmelina Aceves notified.       Edna Funez RN  12/24/22 7181

## 2023-01-01 RX ORDER — METFORMIN HYDROCHLORIDE 500 MG/1
500 TABLET ORAL
COMMUNITY
Start: 2017-08-02

## 2023-01-01 RX ORDER — SOTALOL HYDROCHLORIDE 80 MG/1
80 TABLET ORAL EVERY 12 HOURS
COMMUNITY
Start: 2023-01-30

## 2023-01-01 RX ORDER — METFORMIN HYDROCHLORIDE 750 MG/1
1 TABLET, EXTENDED RELEASE ORAL
COMMUNITY
Start: 2022-09-15

## 2023-01-01 RX ORDER — ALBUTEROL SULFATE 90 UG/1
2 AEROSOL, METERED RESPIRATORY (INHALATION)
COMMUNITY
Start: 2014-09-24

## 2023-01-01 RX ORDER — ATORVASTATIN CALCIUM 20 MG/1
20 TABLET, FILM COATED ORAL DAILY
COMMUNITY

## 2023-06-30 ENCOUNTER — ANESTHESIA EVENT (OUTPATIENT)
Dept: OPERATING ROOM | Age: 58
End: 2023-06-30
Payer: COMMERCIAL

## 2023-06-30 ENCOUNTER — HOSPITAL ENCOUNTER (OUTPATIENT)
Dept: HOSPITAL 101 - LAB | Age: 58
Discharge: HOME | End: 2023-06-30
Payer: COMMERCIAL

## 2023-06-30 DIAGNOSIS — E11.9: ICD-10-CM

## 2023-06-30 DIAGNOSIS — I48.91: ICD-10-CM

## 2023-06-30 DIAGNOSIS — I10: ICD-10-CM

## 2023-06-30 DIAGNOSIS — Z01.812: Primary | ICD-10-CM

## 2023-06-30 LAB
ADD MANUAL DIFF: NO
ANION GAP: 14.1
BLOOD UREA NITROGEN: 17 MG/DL (ref 7–18)
CALCIUM: 9.3 MG/DL (ref 8.5–10.1)
CARBON DIOXIDE: 29.6 MMOL/L (ref 21–32)
CHLORIDE: 100 MMOL/L (ref 98–107)
CO2 BLD-SCNC: 29.6 MMOL/L (ref 21–32)
ESTIMATED GFR (AFRICAN AMERICA: >60 (ref 60–?)
ESTIMATED GFR (NON-AFRICAN AME: >60 (ref 60–?)
GLUCOSE BLD-MCNC: 239 MG/DL (ref 74–106)
HCT VFR BLD CALC: 39.9 % (ref 42–54)
HEMATOCRIT: 39.9 % (ref 42–54)
HEMOGLOBIN: 13.1 G/DL (ref 14–18)
IMMATURE GRANULOCYTES ABS AUTO: 0.06 10^3/UL (ref 0–0.03)
IMMATURE GRANULOCYTES PCT AUTO: 0.6 % (ref 0–0.5)
LYMPHOCYTES  ABSOLUTE AUTO: 2.7 10^3/UL (ref 1.2–3.8)
MCV RBC: 88.3 FL (ref 80–94)
MEAN CORPUSCULAR HEMOGLOBIN: 29 PG (ref 25.9–34)
MEAN CORPUSCULAR HGB CONC: 32.8 G/DL (ref 29.9–35.2)
MEAN CORPUSCULAR VOLUME: 88.3 FL (ref 80–94)
PLATELET # BLD: 222 10^3/UL (ref 150–450)
PLATELET COUNT: 222 10^3/UL (ref 150–450)
POTASSIUM SERPLBLD-SCNC: 4.7 MMOL/L (ref 3.5–5.1)
POTASSIUM: 4.7 MMOL/L (ref 3.5–5.1)
RED BLOOD COUNT: 4.52 10^6/UL (ref 4.7–6.1)
SODIUM BLD-SCNC: 139 MMOL/L (ref 136–145)
SODIUM: 139 MMOL/L (ref 136–145)
WBC # BLD: 9.3 10^3/UL (ref 4–11)
WHITE BLOOD COUNT: 9.3 10^3/UL (ref 4–11)

## 2023-06-30 PROCEDURE — 85025 COMPLETE CBC W/AUTO DIFF WBC: CPT

## 2023-06-30 PROCEDURE — 80048 BASIC METABOLIC PNL TOTAL CA: CPT

## 2023-06-30 PROCEDURE — 36415 COLL VENOUS BLD VENIPUNCTURE: CPT

## 2023-07-03 RX ORDER — HYDROCODONE BITARTRATE AND ACETAMINOPHEN 5; 325 MG/1; MG/1
1 TABLET ORAL EVERY 6 HOURS PRN
Status: ON HOLD | COMMUNITY
End: 2023-07-05 | Stop reason: HOSPADM

## 2023-07-03 RX ORDER — CEFDINIR 300 MG/1
300 CAPSULE ORAL 2 TIMES DAILY
COMMUNITY

## 2023-07-03 RX ORDER — SOTALOL HYDROCHLORIDE 160 MG/1
160 TABLET ORAL 2 TIMES DAILY
COMMUNITY

## 2023-07-03 NOTE — DISCHARGE INSTRUCTIONS
Leave the bandage on your hand and finger. Do not remove it. Keep it dry. Elevate the hand towards the ceiling is much as possible to help with swelling. Continue taking your antibiotic that you were on prior to surgery. It is okay to resume your blood thinner at your next scheduled dose either the night of surgery or the following morning. Activity  You have had anesthesia today  Do not drive, operate heavy equipment, consume alcoholic beverages, or make any important decisions  for 24 hours   If you are taking pain medication: Do not drive or consume alcohol. Take your time changing positions today. You may feel light headed or dizzy if you move too quickly. Continue your home medications as ordered by your physician. Diet   You can eat your normal diet when you feel well. You should start off with bland foods like chicken soup, toast, or yogurt. Then advance as tolerated. Drink plenty of fluids (unless your doctor tells you not to). Your urine should be very lightly colored without a strong odor.

## 2023-07-05 ENCOUNTER — HOSPITAL ENCOUNTER (OUTPATIENT)
Age: 58
Setting detail: OUTPATIENT SURGERY
Discharge: OTHER INSTITUTION | End: 2023-07-05
Attending: ORTHOPAEDIC SURGERY | Admitting: ORTHOPAEDIC SURGERY
Payer: COMMERCIAL

## 2023-07-05 ENCOUNTER — ANESTHESIA (OUTPATIENT)
Dept: OPERATING ROOM | Age: 58
End: 2023-07-05
Payer: COMMERCIAL

## 2023-07-05 VITALS
RESPIRATION RATE: 22 BRPM | DIASTOLIC BLOOD PRESSURE: 85 MMHG | BODY MASS INDEX: 36.73 KG/M2 | HEIGHT: 71 IN | OXYGEN SATURATION: 95 % | TEMPERATURE: 96.8 F | WEIGHT: 262.4 LBS | SYSTOLIC BLOOD PRESSURE: 135 MMHG | HEART RATE: 65 BPM

## 2023-07-05 DIAGNOSIS — M86.9 OSTEOMYELITIS OF FINGER OF LEFT HAND (HCC): ICD-10-CM

## 2023-07-05 DIAGNOSIS — G89.18 POST-OP PAIN: Primary | ICD-10-CM

## 2023-07-05 LAB
GLUCOSE BLD-MCNC: 104 MG/DL (ref 75–110)
GLUCOSE BLD-MCNC: 57 MG/DL (ref 75–110)
GLUCOSE BLD-MCNC: 77 MG/DL (ref 75–110)

## 2023-07-05 PROCEDURE — 2709999900 HC NON-CHARGEABLE SUPPLY: Performed by: ORTHOPAEDIC SURGERY

## 2023-07-05 PROCEDURE — 3600000014 HC SURGERY LEVEL 4 ADDTL 15MIN: Performed by: ORTHOPAEDIC SURGERY

## 2023-07-05 PROCEDURE — 93005 ELECTROCARDIOGRAM TRACING: CPT

## 2023-07-05 PROCEDURE — 26951 AMPUTATION OF FINGER/THUMB: CPT | Performed by: ORTHOPAEDIC SURGERY

## 2023-07-05 PROCEDURE — 6370000000 HC RX 637 (ALT 250 FOR IP)

## 2023-07-05 PROCEDURE — 3700000001 HC ADD 15 MINUTES (ANESTHESIA): Performed by: ORTHOPAEDIC SURGERY

## 2023-07-05 PROCEDURE — 2500000003 HC RX 250 WO HCPCS

## 2023-07-05 PROCEDURE — 94640 AIRWAY INHALATION TREATMENT: CPT

## 2023-07-05 PROCEDURE — 87076 CULTURE ANAEROBE IDENT EACH: CPT

## 2023-07-05 PROCEDURE — 7100000000 HC PACU RECOVERY - FIRST 15 MIN: Performed by: ORTHOPAEDIC SURGERY

## 2023-07-05 PROCEDURE — A4216 STERILE WATER/SALINE, 10 ML: HCPCS | Performed by: ANESTHESIOLOGY

## 2023-07-05 PROCEDURE — 6360000002 HC RX W HCPCS: Performed by: ORTHOPAEDIC SURGERY

## 2023-07-05 PROCEDURE — 87205 SMEAR GRAM STAIN: CPT

## 2023-07-05 PROCEDURE — 87176 TISSUE HOMOGENIZATION CULTR: CPT

## 2023-07-05 PROCEDURE — 86403 PARTICLE AGGLUT ANTBDY SCRN: CPT

## 2023-07-05 PROCEDURE — 2500000003 HC RX 250 WO HCPCS: Performed by: ANESTHESIOLOGY

## 2023-07-05 PROCEDURE — 88305 TISSUE EXAM BY PATHOLOGIST: CPT

## 2023-07-05 PROCEDURE — 7100000011 HC PHASE II RECOVERY - ADDTL 15 MIN: Performed by: ORTHOPAEDIC SURGERY

## 2023-07-05 PROCEDURE — 7100000010 HC PHASE II RECOVERY - FIRST 15 MIN: Performed by: ORTHOPAEDIC SURGERY

## 2023-07-05 PROCEDURE — 3700000000 HC ANESTHESIA ATTENDED CARE: Performed by: ORTHOPAEDIC SURGERY

## 2023-07-05 PROCEDURE — 7100000001 HC PACU RECOVERY - ADDTL 15 MIN: Performed by: ORTHOPAEDIC SURGERY

## 2023-07-05 PROCEDURE — 87070 CULTURE OTHR SPECIMN AEROBIC: CPT

## 2023-07-05 PROCEDURE — 82947 ASSAY GLUCOSE BLOOD QUANT: CPT

## 2023-07-05 PROCEDURE — 6360000002 HC RX W HCPCS

## 2023-07-05 PROCEDURE — 2580000003 HC RX 258: Performed by: ANESTHESIOLOGY

## 2023-07-05 PROCEDURE — 6360000002 HC RX W HCPCS: Performed by: NURSE ANESTHETIST, CERTIFIED REGISTERED

## 2023-07-05 PROCEDURE — 87075 CULTR BACTERIA EXCEPT BLOOD: CPT

## 2023-07-05 PROCEDURE — 2580000003 HC RX 258: Performed by: ORTHOPAEDIC SURGERY

## 2023-07-05 PROCEDURE — 3600000004 HC SURGERY LEVEL 4 BASE: Performed by: ORTHOPAEDIC SURGERY

## 2023-07-05 RX ORDER — SODIUM CHLORIDE 9 MG/ML
INJECTION, SOLUTION INTRAVENOUS PRN
Status: DISCONTINUED | OUTPATIENT
Start: 2023-07-05 | End: 2023-07-05 | Stop reason: HOSPADM

## 2023-07-05 RX ORDER — CEFAZOLIN 2 G/1
INJECTION, POWDER, FOR SOLUTION INTRAMUSCULAR; INTRAVENOUS
Status: COMPLETED
Start: 2023-07-05 | End: 2023-07-05

## 2023-07-05 RX ORDER — IPRATROPIUM BROMIDE AND ALBUTEROL SULFATE 2.5; .5 MG/3ML; MG/3ML
1 SOLUTION RESPIRATORY (INHALATION) ONCE
Status: DISCONTINUED | OUTPATIENT
Start: 2023-07-05 | End: 2023-07-05 | Stop reason: HOSPADM

## 2023-07-05 RX ORDER — DIPHENHYDRAMINE HYDROCHLORIDE 50 MG/ML
12.5 INJECTION INTRAMUSCULAR; INTRAVENOUS
Status: DISCONTINUED | OUTPATIENT
Start: 2023-07-05 | End: 2023-07-05 | Stop reason: HOSPADM

## 2023-07-05 RX ORDER — IPRATROPIUM BROMIDE AND ALBUTEROL SULFATE 2.5; .5 MG/3ML; MG/3ML
SOLUTION RESPIRATORY (INHALATION)
Status: COMPLETED
Start: 2023-07-05 | End: 2023-07-05

## 2023-07-05 RX ORDER — DEXAMETHASONE SODIUM PHOSPHATE 10 MG/ML
INJECTION, SOLUTION INTRAMUSCULAR; INTRAVENOUS PRN
Status: DISCONTINUED | OUTPATIENT
Start: 2023-07-05 | End: 2023-07-05 | Stop reason: SDUPTHER

## 2023-07-05 RX ORDER — MEPERIDINE HYDROCHLORIDE 50 MG/ML
12.5 INJECTION INTRAMUSCULAR; INTRAVENOUS; SUBCUTANEOUS EVERY 5 MIN PRN
Status: DISCONTINUED | OUTPATIENT
Start: 2023-07-05 | End: 2023-07-05 | Stop reason: HOSPADM

## 2023-07-05 RX ORDER — SODIUM CHLORIDE 0.9 % (FLUSH) 0.9 %
5-40 SYRINGE (ML) INJECTION PRN
Status: DISCONTINUED | OUTPATIENT
Start: 2023-07-05 | End: 2023-07-05 | Stop reason: HOSPADM

## 2023-07-05 RX ORDER — IPRATROPIUM BROMIDE AND ALBUTEROL SULFATE 2.5; .5 MG/3ML; MG/3ML
1 SOLUTION RESPIRATORY (INHALATION)
Status: DISCONTINUED | OUTPATIENT
Start: 2023-07-05 | End: 2023-07-05 | Stop reason: HOSPADM

## 2023-07-05 RX ORDER — PHENYLEPHRINE HCL IN 0.9% NACL 1 MG/10 ML
SYRINGE (ML) INTRAVENOUS PRN
Status: DISCONTINUED | OUTPATIENT
Start: 2023-07-05 | End: 2023-07-05 | Stop reason: SDUPTHER

## 2023-07-05 RX ORDER — LABETALOL HYDROCHLORIDE 5 MG/ML
10 INJECTION, SOLUTION INTRAVENOUS
Status: DISCONTINUED | OUTPATIENT
Start: 2023-07-05 | End: 2023-07-05 | Stop reason: HOSPADM

## 2023-07-05 RX ORDER — SODIUM CHLORIDE 0.9 % (FLUSH) 0.9 %
5-40 SYRINGE (ML) INJECTION EVERY 12 HOURS SCHEDULED
Status: DISCONTINUED | OUTPATIENT
Start: 2023-07-05 | End: 2023-07-05 | Stop reason: HOSPADM

## 2023-07-05 RX ORDER — OXYCODONE HYDROCHLORIDE AND ACETAMINOPHEN 5; 325 MG/1; MG/1
1-2 TABLET ORAL EVERY 6 HOURS PRN
Qty: 30 TABLET | Refills: 0 | Status: SHIPPED | OUTPATIENT
Start: 2023-07-05 | End: 2023-07-12

## 2023-07-05 RX ORDER — FAMOTIDINE 10 MG/ML
INJECTION, SOLUTION INTRAVENOUS
Status: DISCONTINUED
Start: 2023-07-05 | End: 2023-07-05 | Stop reason: HOSPADM

## 2023-07-05 RX ORDER — GLYCOPYRROLATE 0.2 MG/ML
0.4 INJECTION INTRAMUSCULAR; INTRAVENOUS ONCE
Status: DISCONTINUED | OUTPATIENT
Start: 2023-07-05 | End: 2023-07-05 | Stop reason: HOSPADM

## 2023-07-05 RX ORDER — FENTANYL CITRATE 50 UG/ML
INJECTION, SOLUTION INTRAMUSCULAR; INTRAVENOUS PRN
Status: DISCONTINUED | OUTPATIENT
Start: 2023-07-05 | End: 2023-07-05 | Stop reason: SDUPTHER

## 2023-07-05 RX ORDER — APREPITANT 40 MG/1
CAPSULE ORAL
Status: COMPLETED
Start: 2023-07-05 | End: 2023-07-05

## 2023-07-05 RX ORDER — APREPITANT 40 MG/1
40 CAPSULE ORAL ONCE
Status: COMPLETED | OUTPATIENT
Start: 2023-07-05 | End: 2023-07-05

## 2023-07-05 RX ORDER — CEFAZOLIN SODIUM 1 G/3ML
INJECTION, POWDER, FOR SOLUTION INTRAMUSCULAR; INTRAVENOUS PRN
Status: DISCONTINUED | OUTPATIENT
Start: 2023-07-05 | End: 2023-07-05 | Stop reason: SDUPTHER

## 2023-07-05 RX ORDER — MORPHINE SULFATE 2 MG/ML
2 INJECTION, SOLUTION INTRAMUSCULAR; INTRAVENOUS EVERY 5 MIN PRN
Status: DISCONTINUED | OUTPATIENT
Start: 2023-07-05 | End: 2023-07-05 | Stop reason: HOSPADM

## 2023-07-05 RX ORDER — PROPOFOL 10 MG/ML
INJECTION, EMULSION INTRAVENOUS PRN
Status: DISCONTINUED | OUTPATIENT
Start: 2023-07-05 | End: 2023-07-05 | Stop reason: SDUPTHER

## 2023-07-05 RX ORDER — HYDRALAZINE HYDROCHLORIDE 20 MG/ML
10 INJECTION INTRAMUSCULAR; INTRAVENOUS
Status: DISCONTINUED | OUTPATIENT
Start: 2023-07-05 | End: 2023-07-05 | Stop reason: HOSPADM

## 2023-07-05 RX ORDER — OXYCODONE HYDROCHLORIDE AND ACETAMINOPHEN 5; 325 MG/1; MG/1
1 TABLET ORAL
Status: DISCONTINUED | OUTPATIENT
Start: 2023-07-05 | End: 2023-07-05 | Stop reason: HOSPADM

## 2023-07-05 RX ORDER — MIDAZOLAM HYDROCHLORIDE 2 MG/2ML
2 INJECTION, SOLUTION INTRAMUSCULAR; INTRAVENOUS
Status: DISCONTINUED | OUTPATIENT
Start: 2023-07-05 | End: 2023-07-05 | Stop reason: HOSPADM

## 2023-07-05 RX ORDER — ROPIVACAINE HYDROCHLORIDE 5 MG/ML
INJECTION, SOLUTION EPIDURAL; INFILTRATION; PERINEURAL PRN
Status: DISCONTINUED | OUTPATIENT
Start: 2023-07-05 | End: 2023-07-05 | Stop reason: ALTCHOICE

## 2023-07-05 RX ORDER — METOCLOPRAMIDE HYDROCHLORIDE 5 MG/ML
10 INJECTION INTRAMUSCULAR; INTRAVENOUS
Status: DISCONTINUED | OUTPATIENT
Start: 2023-07-05 | End: 2023-07-05 | Stop reason: HOSPADM

## 2023-07-05 RX ORDER — ACETAMINOPHEN 500 MG
TABLET ORAL
Status: COMPLETED
Start: 2023-07-05 | End: 2023-07-05

## 2023-07-05 RX ORDER — OXYCODONE HYDROCHLORIDE AND ACETAMINOPHEN 5; 325 MG/1; MG/1
2 TABLET ORAL
Status: DISCONTINUED | OUTPATIENT
Start: 2023-07-05 | End: 2023-07-05 | Stop reason: HOSPADM

## 2023-07-05 RX ORDER — ONDANSETRON 2 MG/ML
INJECTION INTRAMUSCULAR; INTRAVENOUS PRN
Status: DISCONTINUED | OUTPATIENT
Start: 2023-07-05 | End: 2023-07-05 | Stop reason: SDUPTHER

## 2023-07-05 RX ORDER — LIDOCAINE HYDROCHLORIDE 10 MG/ML
INJECTION, SOLUTION INFILTRATION; PERINEURAL PRN
Status: DISCONTINUED | OUTPATIENT
Start: 2023-07-05 | End: 2023-07-05 | Stop reason: SDUPTHER

## 2023-07-05 RX ORDER — PROMETHAZINE HYDROCHLORIDE 25 MG/ML
6.25 INJECTION, SOLUTION INTRAMUSCULAR; INTRAVENOUS EVERY 5 MIN PRN
Status: DISCONTINUED | OUTPATIENT
Start: 2023-07-05 | End: 2023-07-05 | Stop reason: HOSPADM

## 2023-07-05 RX ORDER — SODIUM CHLORIDE, SODIUM LACTATE, POTASSIUM CHLORIDE, CALCIUM CHLORIDE 600; 310; 30; 20 MG/100ML; MG/100ML; MG/100ML; MG/100ML
INJECTION, SOLUTION INTRAVENOUS CONTINUOUS
Status: DISCONTINUED | OUTPATIENT
Start: 2023-07-05 | End: 2023-07-05 | Stop reason: HOSPADM

## 2023-07-05 RX ORDER — SCOLOPAMINE TRANSDERMAL SYSTEM 1 MG/1
PATCH, EXTENDED RELEASE TRANSDERMAL
Status: DISCONTINUED
Start: 2023-07-05 | End: 2023-07-05 | Stop reason: HOSPADM

## 2023-07-05 RX ORDER — MIDAZOLAM HYDROCHLORIDE 1 MG/ML
INJECTION INTRAMUSCULAR; INTRAVENOUS PRN
Status: DISCONTINUED | OUTPATIENT
Start: 2023-07-05 | End: 2023-07-05 | Stop reason: SDUPTHER

## 2023-07-05 RX ORDER — ACETAMINOPHEN 500 MG
1000 TABLET ORAL ONCE
Status: COMPLETED | OUTPATIENT
Start: 2023-07-05 | End: 2023-07-05

## 2023-07-05 RX ORDER — ONDANSETRON 2 MG/ML
4 INJECTION INTRAMUSCULAR; INTRAVENOUS
Status: DISCONTINUED | OUTPATIENT
Start: 2023-07-05 | End: 2023-07-05 | Stop reason: HOSPADM

## 2023-07-05 RX ORDER — SCOLOPAMINE TRANSDERMAL SYSTEM 1 MG/1
1 PATCH, EXTENDED RELEASE TRANSDERMAL ONCE
Status: DISCONTINUED | OUTPATIENT
Start: 2023-07-05 | End: 2023-07-05 | Stop reason: HOSPADM

## 2023-07-05 RX ADMIN — MIDAZOLAM 2 MG: 1 INJECTION INTRAMUSCULAR; INTRAVENOUS at 13:18

## 2023-07-05 RX ADMIN — Medication 100 MCG: at 14:04

## 2023-07-05 RX ADMIN — FENTANYL CITRATE 25 MCG: 50 INJECTION, SOLUTION INTRAMUSCULAR; INTRAVENOUS at 13:32

## 2023-07-05 RX ADMIN — Medication 100 MCG: at 13:54

## 2023-07-05 RX ADMIN — Medication 100 MCG: at 14:08

## 2023-07-05 RX ADMIN — ONDANSETRON 4 MG: 2 INJECTION INTRAMUSCULAR; INTRAVENOUS at 14:12

## 2023-07-05 RX ADMIN — Medication 100 MCG: at 14:01

## 2023-07-05 RX ADMIN — Medication 100 MCG: at 13:40

## 2023-07-05 RX ADMIN — APREPITANT 40 MG: 40 CAPSULE ORAL at 13:13

## 2023-07-05 RX ADMIN — ACETAMINOPHEN 1000 MG: 500 TABLET ORAL at 12:18

## 2023-07-05 RX ADMIN — PROPOFOL 200 MG: 10 INJECTION, EMULSION INTRAVENOUS at 13:23

## 2023-07-05 RX ADMIN — FAMOTIDINE 20 MG: 10 INJECTION, SOLUTION INTRAVENOUS at 13:18

## 2023-07-05 RX ADMIN — DEXAMETHASONE SODIUM PHOSPHATE 4 MG: 10 INJECTION, SOLUTION INTRAMUSCULAR; INTRAVENOUS at 13:30

## 2023-07-05 RX ADMIN — CEFAZOLIN 2 G: 2 INJECTION, POWDER, FOR SOLUTION INTRAMUSCULAR; INTRAVENOUS at 13:31

## 2023-07-05 RX ADMIN — IPRATROPIUM BROMIDE AND ALBUTEROL SULFATE 3 ML: .5; 2.5 SOLUTION RESPIRATORY (INHALATION) at 12:55

## 2023-07-05 RX ADMIN — FENTANYL CITRATE 25 MCG: 50 INJECTION, SOLUTION INTRAMUSCULAR; INTRAVENOUS at 13:35

## 2023-07-05 RX ADMIN — Medication 100 MCG: at 13:48

## 2023-07-05 RX ADMIN — SODIUM CHLORIDE: 9 INJECTION, SOLUTION INTRAVENOUS at 12:19

## 2023-07-05 RX ADMIN — LIDOCAINE HYDROCHLORIDE 40 MG: 10 INJECTION, SOLUTION INFILTRATION; PERINEURAL at 13:23

## 2023-07-05 RX ADMIN — Medication 1000 MG: at 12:18

## 2023-07-05 RX ADMIN — FENTANYL CITRATE 50 MCG: 50 INJECTION, SOLUTION INTRAMUSCULAR; INTRAVENOUS at 14:18

## 2023-07-05 ASSESSMENT — PAIN - FUNCTIONAL ASSESSMENT
PAIN_FUNCTIONAL_ASSESSMENT: NONE - DENIES PAIN
PAIN_FUNCTIONAL_ASSESSMENT: NONE - DENIES PAIN
PAIN_FUNCTIONAL_ASSESSMENT: 0-10

## 2023-07-05 ASSESSMENT — PAIN SCALES - GENERAL: PAINLEVEL_OUTOF10: 0

## 2023-07-05 NOTE — H&P
ORTHOPEDIC PREOP HISTORY AND PHYSICAL      HPI / Chief Complaint  Samson Javed is a 62 y.o. male who presents for left middle finger infection    Past Medical History  Marquis Katz  has a past medical history of ASHANTI (acute kidney injury) ([address]), Atrial fibrillation ([address]), Atrial flutter ([address]), COPD (chronic obstructive pulmonary disease) ([address]), Diabetes mellitus ([address]), Hypertension, and Sleep apnea. Past Surgical History  Marquis Katz  has a past surgical history that includes Appendectomy and Cervical spine surgery. Current Medications  No current facility-administered medications for this encounter. Current Outpatient Medications   Medication Sig Dispense Refill    cefdinir (OMNICEF) 300 MG capsule Take 1 capsule by mouth 2 times daily      sotalol (BETAPACE) 160 MG tablet Take 1 tablet by mouth 2 times daily      HYDROcodone-acetaminophen (NORCO) 5-325 MG per tablet Take 1 tablet by mouth every 6 hours as needed for Pain. Max Daily Amount: 4 tablets      budesonide-formoterol (SYMBICORT) 160-4.5 MCG/ACT AERO Inhale 2 puffs into the lungs      buPROPion (WELLBUTRIN SR) 150 MG extended release tablet Take 150 mg by mouth      gabapentin (NEURONTIN) 100 MG capsule Take 200 mg by mouth      fenofibrate (TRICOR) 145 MG tablet TAKE 1 TABLET BY MOUTH EVERY DAY  3    metFORMIN (GLUCOPHAGE) 500 MG tablet TAKE 1 TABLET BY MOUTH TWICE DAILY WITH MEALS  5    SPIRIVA RESPIMAT 2.5 MCG/ACT AERS inhaler INHALE 2 (TWO) puffs ONCE DAILY  2    traMADol (ULTRAM) 50 MG tablet TAKE 1 TO 2 TABLETS BY MOUTH THREE TIMES DAILY AS NEEDED FOR PAIN.  2    albuterol (PROVENTIL HFA) 108 (90 BASE) MCG/ACT inhaler Inhale 2 puffs into the lungs every 6 hours as needed for Wheezing. 1 Inhaler 1    rivaroxaban (XARELTO) 15 MG TABS tablet Take 1 tablet by mouth 2 times daily (with meals) for 21 days. 32 tablet 0    insulin glargine (LANTUS) 100 UNIT/ML injection Inject 5 Units into the skin nightly.  (Patient taking differently: Inject 80 Units into

## 2023-07-05 NOTE — ANESTHESIA PRE PROCEDURE
Department of Anesthesiology  Preprocedure Note       Name:  Abby Da Silva   Age:  62 y.o.  :  1965                                          MRN:  [id number]         Date:  2023      Surgeon: Rudy Hare):  Amee Palafox MD    Procedure: Procedure(s):  LEFT MIDDLE FINGER INCISION AND DRAINAGE  possible LEFT MIDDLE FINGER AMPUTATION    Medications prior to admission:   Prior to Admission medications    Medication Sig Start Date End Date Taking? Authorizing Provider   cefdinir (OMNICEF) 300 MG capsule Take 1 capsule by mouth 2 times daily   Yes Historical Provider, MD   sotalol (BETAPACE) 160 MG tablet Take 1 tablet by mouth 2 times daily   Yes Historical Provider, MD   HYDROcodone-acetaminophen (NORCO) 5-325 MG per tablet Take 1 tablet by mouth every 6 hours as needed for Pain. Max Daily Amount: 4 tablets   Yes Historical Provider, MD   budesonide-formoterol (SYMBICORT) 160-4.5 MCG/ACT AERO Inhale 2 puffs into the lungs    Historical Provider, MD   buPROPion (WELLBUTRIN SR) 150 MG extended release tablet Take 150 mg by mouth    Historical Provider, MD   gabapentin (NEURONTIN) 100 MG capsule Take 200 mg by mouth    Historical Provider, MD   fenofibrate (TRICOR) 145 MG tablet TAKE 1 TABLET BY MOUTH EVERY DAY 10/4/17   Historical Provider, MD   metFORMIN (GLUCOPHAGE) 500 MG tablet TAKE 1 TABLET BY MOUTH TWICE DAILY WITH MEALS 17   Historical Provider, MD   SPIRIVA RESPIMAT 2.5 MCG/ACT AERS inhaler INHALE 2 (TWO) puffs ONCE DAILY 17   Historical Provider, MD   traMADol (ULTRAM) 50 MG tablet TAKE 1 TO 2 TABLETS BY MOUTH THREE TIMES DAILY AS NEEDED FOR PAIN. 10/5/17   Historical Provider, MD   albuterol (PROVENTIL HFA) 108 (90 BASE) MCG/ACT inhaler Inhale 2 puffs into the lungs every 6 hours as needed for Wheezing. 14   Theo Weeks MD   rivaroxaban (XARELTO) 15 MG TABS tablet Take 1 tablet by mouth 2 times daily (with meals) for 21 days.  5/28/14 7/3/23  Boni Santiago MD   insulin glargine

## 2023-07-05 NOTE — ANESTHESIA POSTPROCEDURE EVALUATION
Department of Anesthesiology  Postprocedure Note    Patient: Isamar Schaffer  MRN: [id number]  YOB: 1965  Date of evaluation: 7/5/2023      Procedure Summary     Date: 07/05/23 Room / Location: [facility] OR [facility]    Anesthesia Start: 0649 Anesthesia Stop: 7430    Procedure: LEFT MIDDLE FINGER AMPUTATION (Left: Middle Finger) Diagnosis:       Osteomyelitis of finger of left hand (HCC)      (Osteomyelitis of finger of left hand ([address]) [M86.9])    Surgeons: Sarah Boggs MD Responsible Provider: Maureen Turner MD    Anesthesia Type: general ASA Status: 3          Anesthesia Type: No value filed.     Shay Phase I: Shay Score: 10    Shay Phase II:        Anesthesia Post Evaluation    Patient location during evaluation: PACU  Patient participation: complete - patient participated  Level of consciousness: awake and alert  Airway patency: patent  Nausea & Vomiting: no nausea and no vomiting  Complications: no  Cardiovascular status: hemodynamically stable  Respiratory status: room air and spontaneous ventilation  Hydration status: euvolemic  Multimodal analgesia pain management approach

## 2023-07-05 NOTE — OP NOTE
Operative Note      Patient: Shirley Porras  YOB: 1965  MRN: [id number]    Date of Procedure: 7/5/2023    Pre-Op Diagnosis Codes:     * Osteomyelitis of finger of left hand ([address]) [M86.9]    Post-Op Diagnosis: Same       Procedure(s):  LEFT MIDDLE FINGER AMPUTATION    Surgeon(s):  Claudio Hanna MD    Assistant:   * No surgical staff found *    Anesthesia: Monitor Anesthesia Care    Estimated Blood Loss (mL): Minimal    Complications: None    Specimens:   ID Type Source Tests Collected by Time Destination   1 : LEFT MIDDLE FINGER Tissue Tissue CULTURE, TISSUE, CULTURE, ANAEROBIC AND AEROBIC Claudio Hanna MD 7/5/2023 1358    2 : LEFT MIDDLE FINGER BONE Bone Tissue CULTURE, TISSUE, CULTURE, ANAEROBIC AND AEROBIC Claudio aHnna MD 7/5/2023 1400    A :  LEFT MIDDLE FINGER R/O OSTEOMYELITIS Tissue Tissue SURGICAL PATHOLOGY Claudio Hanna MD 7/5/2023 1400        Implants:  * No implants in log *      Drains: * No LDAs found *    Findings: see below        Detailed Description of Procedure:   Informed consent was obtained. I marked his left middle finger. He was brought back to the operating room where anesthesia was smoothly induced. The left middle finger was prepped and draped in normal sterile fashion. A timeout was performed. He did receive prophylactic antibiotics. I anesthetized the finger with half percent ropivacaine plain. A finger tourniquet was applied. He had dead flaky epidermis tissue throughout most of the middle finger and this was removed easily with a pickup. He had an area dorsally over the DIP joint with necrotic skin. This was excised. The subcutaneous tissue was necrotic. This led directly to the DIP joint and the head of the middle phalanx. The bone here was flaky and using my pickup I was easily able to push through the cortical bone of the head of the middle phalanx. This told me that the osteomyelitis was quite severe and that he would require amputation.   The

## 2023-07-07 LAB — SURGICAL PATHOLOGY REPORT: NORMAL

## 2023-07-09 LAB
EKG ATRIAL RATE: 65 BPM
EKG P AXIS: 23 DEGREES
EKG P-R INTERVAL: 220 MS
EKG Q-T INTERVAL: 434 MS
EKG QRS DURATION: 92 MS
EKG QTC CALCULATION (BAZETT): 451 MS
EKG R AXIS: 61 DEGREES
EKG T AXIS: 29 DEGREES
EKG VENTRICULAR RATE: 65 BPM
MICROORGANISM SPEC CULT: ABNORMAL
MICROORGANISM/AGENT SPEC: ABNORMAL
SPECIMEN DESCRIPTION: ABNORMAL
SPECIMEN DESCRIPTION: ABNORMAL

## 2023-07-10 LAB
MICROORGANISM SPEC CULT: ABNORMAL
MICROORGANISM SPEC CULT: ABNORMAL
MICROORGANISM/AGENT SPEC: ABNORMAL
MICROORGANISM/AGENT SPEC: ABNORMAL
SPECIMEN DESCRIPTION: ABNORMAL

## 2023-10-10 ENCOUNTER — HOSPITAL ENCOUNTER (EMERGENCY)
Age: 58
Discharge: HOME | End: 2023-10-10
Payer: COMMERCIAL

## 2023-10-10 VITALS — BODY MASS INDEX: 37.3 KG/M2

## 2023-10-10 VITALS
HEART RATE: 79 BPM | OXYGEN SATURATION: 94 % | SYSTOLIC BLOOD PRESSURE: 122 MMHG | TEMPERATURE: 97.7 F | RESPIRATION RATE: 18 BRPM | DIASTOLIC BLOOD PRESSURE: 77 MMHG

## 2023-10-10 DIAGNOSIS — Z87.891: ICD-10-CM

## 2023-10-10 DIAGNOSIS — M26.601: Primary | ICD-10-CM

## 2023-10-10 PROCEDURE — 99281 EMR DPT VST MAYX REQ PHY/QHP: CPT

## 2023-10-10 NOTE — ED.EAR1
HPI - Ear Problem
General
Chief complaint: Ear
Stated complaint: RIGHT EAR PAIN
Time Seen by Provider: 10/10/23 12:22
Source: patient
Mode of arrival: walk-in
Limitations: no limitations
History of Present Illness
HPI Narrative: 
patient's here with aching in his right ear. He says he has some decreased hearing in that ear as well as his left ear. He says he saw an audiologist in [place] last year and he had hearing loss present things as getting worse. He does not have any 
other [place] symptoms cough cold congestion or sore throat. No history of trauma. He is edentulous and  gums his food no other complaints today.
Related Data
Allergies

Allergy/AdvReac Type Severity Reaction Status Date / Time
amoxicillin Allergy Mild  Verified 10/10/23 12:07
penicillin G Allergy Mild  Verified 10/10/23 12:07



PFS
PFSH
Social History
Smoking status:  Former smoker 



Exam
Narrative
Exam Narrative: 
awake alert normal vital signs appears in no discomfort whatsoever. Problem focused examination as noted below

. Left tympanic membrane was well visualized there is tiny amount of wax in the canal that's asymptomatic. He has no tenderness over the left temporomandibular joint. Right ear shows completely normal eardrum with no erythema bulging or bullae. Tiny 
amount of wax in the external canal. He does have some tenderness palpation over the left she's been the right temporomandibular joint. Does not have a tenderness over the mastoid area. The external canal appears completely normal. His oral cavity 
is unremarkable except he has no teeth at all. His findings are consistent with right temporomandibular joint and chronic hearing loss. He is advised follow-up with audiology to further evaluate that and to be placed on oral NSAIDs
Constitutional
Vital Signs, click to edit/add: 

Last Vital Signs

Temp  97.7 F   10/10/23 12:08
Pulse  79   10/10/23 12:08
Resp  18   10/10/23 12:08
BP  122/77   10/10/23 12:08
Pulse Ox  94 L  10/10/23 12:08
O2 Del Method  Room Air  10/10/23 12:08




Course
Vital Signs
Vital signs: 

Vital Signs

Temperature  97.7 F   10/10/23 12:08
Pulse Rate  79   10/10/23 12:08
Respiratory Rate  18   10/10/23 12:08
Blood Pressure  122/77   10/10/23 12:08
Pulse Oximetry  94 L  10/10/23 12:08
Oxygen Delivery Method  Room Air  10/10/23 12:08



Temperature  97.7 F   10/10/23 12:08
Pulse Rate  79   10/10/23 12:08
Respiratory Rate  18   10/10/23 12:08
Blood Pressure  122/77   10/10/23 12:08
Pulse Oximetry  94 L  10/10/23 12:08
Oxygen Delivery Method  Room Air  10/10/23 12:08




Discharge Plan
Discharge
Chief Complaint: Ear

Clinical Impression:
 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


Patient Disposition: Home, Self-Care

Time of Disposition Decision: 12:24

Additional Instructions:
may try over-the-counter NSAIDs or Anaprox. Follow-up with audiology in [place] to really evaluate your hearing loss

Stand Alone Forms:  Portal Instructions

Referrals:
JENA TORO [Primary Care Provider] - 1 week

## 2023-10-10 NOTE — ED_ITS
HPI - Ear Problem    
General    
Chief complaint: Ear    
Stated complaint: RIGHT EAR PAIN    
Time Seen by Provider: 10/10/23 12:22    
Source: patient    
Mode of arrival: walk-in    
Limitations: no limitations    
History of Present Illness    
HPI Narrative:     
patient's here with aching in his right ear. He says he has some decreased   
hearing in that ear as well as his left ear. He says he saw an audiologist in   
[place] last year and he had hearing loss present things as getting worse. He   
does not have any other [place] symptoms cough cold congestion or sore   
throat. No history of trauma. He is edentulous and  gums his food no other comp  
laints today.    
Related Data    
                                    Allergies    
    
    
    
Allergy/AdvReac Type Severity Reaction Status Date / Time    
     
amoxicillin Allergy Mild  Verified 10/10/23 12:07    
     
penicillin G Allergy Mild  Verified 10/10/23 12:07    
    
    
    
    
PFS    
PFSH    
Social History    
Smoking status:  Former smoker     
    
    
    
Exam    
Narrative    
Exam Narrative:     
awake alert normal vital signs appears in no discomfort whatsoever. Problem   
focused examination as noted below    
    
. Left tympanic membrane was well visualized there is tiny amount of wax in the   
canal that's asymptomatic. He has no tenderness over the left temporomandibular   
joint. Right ear shows completely normal eardrum with no erythema bulging or   
bullae. Tiny amount of wax in the external canal. He does have some tenderness   
palpation over the left she's been the right temporomandibular joint. Does not   
have a tenderness over the mastoid area. The external canal appears completely   
normal. His oral cavity is unremarkable except he has no teeth at all. His   
findings are consistent with right temporomandibular joint and chronic hearing   
loss. He is advised follow-up with audiology to further evaluate that and to be   
placed on oral NSAIDs    
Constitutional    
Vital Signs, click to edit/add:     
    
                                Last Vital Signs    
    
    
    
Temp  97.7 F   10/10/23 12:08    
     
Pulse  79   10/10/23 12:08    
     
Resp  18   10/10/23 12:08    
     
BP  122/77   10/10/23 12:08    
     
Pulse Ox  94 L  10/10/23 12:08    
     
O2 Del Method  Room Air  10/10/23 12:08    
    
    
    
    
    
Course    
Vital Signs    
Vital signs:     
    
                                   Vital Signs    
    
    
    
Temperature  97.7 F   10/10/23 12:08    
     
Pulse Rate  79   10/10/23 12:08    
     
Respiratory Rate  18   10/10/23 12:08    
     
Blood Pressure  122/77   10/10/23 12:08    
     
Pulse Oximetry  94 L  10/10/23 12:08    
     
Oxygen Delivery Method  Room Air  10/10/23 12:08    
    
    
                                            
    
    
    
Temperature  97.7 F   10/10/23 12:08    
     
Pulse Rate  79   10/10/23 12:08    
     
Respiratory Rate  18   10/10/23 12:08    
     
Blood Pressure  122/77   10/10/23 12:08    
     
Pulse Oximetry  94 L  10/10/23 12:08    
     
Oxygen Delivery Method  Room Air  10/10/23 12:08    
    
    
    
    
    
Discharge Plan    
Discharge    
Chief Complaint: Ear    
    
Clinical Impression:    
 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    
    
    
Patient Disposition: Home, Self-Care    
    
Time of Disposition Decision: 12:24    
    
Additional Instructions:    
may try over-the-counter NSAIDs or Anaprox. Follow-up with audiology in [place]   
to really evaluate your hearing loss    
    
Stand Alone Forms:  Portal Instructions    
    
Referrals:    
JENA TORO [Primary Care Provider] - 1 week

## 2023-11-18 ENCOUNTER — HOSPITAL ENCOUNTER (EMERGENCY)
Age: 58
Discharge: HOME | End: 2023-11-18
Payer: COMMERCIAL

## 2023-11-18 VITALS — BODY MASS INDEX: 37.5 KG/M2

## 2023-11-18 VITALS
RESPIRATION RATE: 18 BRPM | SYSTOLIC BLOOD PRESSURE: 130 MMHG | OXYGEN SATURATION: 95 % | TEMPERATURE: 97.9 F | DIASTOLIC BLOOD PRESSURE: 82 MMHG | HEART RATE: 89 BPM

## 2023-11-18 DIAGNOSIS — K21.9: ICD-10-CM

## 2023-11-18 DIAGNOSIS — Z86.73: ICD-10-CM

## 2023-11-18 DIAGNOSIS — G47.33: ICD-10-CM

## 2023-11-18 DIAGNOSIS — E78.00: ICD-10-CM

## 2023-11-18 DIAGNOSIS — Z79.84: ICD-10-CM

## 2023-11-18 DIAGNOSIS — Z87.891: ICD-10-CM

## 2023-11-18 DIAGNOSIS — E66.9: ICD-10-CM

## 2023-11-18 DIAGNOSIS — Z79.4: ICD-10-CM

## 2023-11-18 DIAGNOSIS — I73.9: ICD-10-CM

## 2023-11-18 DIAGNOSIS — Z79.899: ICD-10-CM

## 2023-11-18 DIAGNOSIS — H61.22: ICD-10-CM

## 2023-11-18 DIAGNOSIS — I50.9: ICD-10-CM

## 2023-11-18 DIAGNOSIS — Z89.022: ICD-10-CM

## 2023-11-18 DIAGNOSIS — E11.9: ICD-10-CM

## 2023-11-18 DIAGNOSIS — I11.0: ICD-10-CM

## 2023-11-18 DIAGNOSIS — H66.92: Primary | ICD-10-CM

## 2023-11-18 PROCEDURE — 99283 EMERGENCY DEPT VISIT LOW MDM: CPT

## 2023-11-18 PROCEDURE — 69209 REMOVE IMPACTED EAR WAX UNI: CPT

## 2023-11-18 NOTE — ED.EAR1
HPI - Ear Problem
General
Chief complaint: Ear
Time Seen by Provider: 11/18/23 18:36
Source: patient
Mode of arrival: ambulance
Limitations: no limitations
History of Present Illness
HPI Narrative: 
58-year-old male presents for left ear feeling plugged. He recently had his right ear plugged and then it was flushed out. Major fever or so throat and he doesn't have symptoms in the right ear. This started within the last day.
Related Data
Home Medications

 Medication  Instructions  Recorded  Confirmed
albuterol sulfate 90 mcg/actuation 2 inh inhalation Q6H PRN shortness 10/10/23 11/18/23
aerosol inhaler of breath or wheezing  
atorvastatin 20 mg tablet 20 mg PO DAILY 10/10/23 11/18/23
furosemide 40 mg tablet 40 mg PO BID 10/10/23 11/18/23
insulin aspart U-100 100 unit/mL 80 unit subcut BID 10/10/23 11/18/23
(3 mL) subcutaneous pen (Novolog   
FlexPen U-100 Insulin aspart)   
lisinopril 2.5 mg tablet 2.5 mg PO DAILY 10/10/23 11/18/23
metformin 500 mg tablet 500 mg PO BID 10/10/23 11/18/23
oxycodone-acetaminophen 5 mg-325 1 tab PO Q6H PRN pain 10/10/23 11/18/23
mg tablet   
rivaroxaban 20 mg tablet (Xarelto) 20 mg PO DAILY 10/10/23 11/18/23
sotalol 160 mg tablet 160 mg PO BID 10/10/23 11/18/23
docusate sodium 100 mg capsule mg PO 11/18/23 
insulin glargine 100 unit/mL (3 unit subcut BID 11/18/23 
mL) subcutaneous pen (Lantus   
Solostar U-100 Insulin)   
polyethylene glycol 3350 17 17 g PO DAILY 11/18/23 11/18/23
gram/dose oral powder   
tamsulosin 0.4 mg capsule 0.4 mg PO Q24H 11/18/23 11/18/23
tramadol 50 mg tablet mg 11/18/23 


Allergies

Allergy/AdvReac Type Severity Reaction Status Date / Time
amoxicillin Allergy Mild  Verified 10/10/23 12:07
penicillin G Allergy Mild  Verified 10/10/23 12:07



Review of Systems
ROS  
 Narrative A ten point review of systems is negative except as noted above.   

PFSH
PFSH
Medical History (Updated 11/18/23 @ 19:01 by Mihai Garcia MD)

Amputation of finger, left
 ?S68.119A - Complete traumatic metacarpophalangeal amputation of unspecified finger, initial encounter (ICD-10)
Atrial flutter
 ?I48.92 - Unspecified atrial flutter (ICD-10)
Cervicalgia
 ?M54.2 - Cervicalgia (ICD-10)
Cognitive communication deficit
 ?R41.841 - Cognitive communication deficit (ICD-10)
Congestive heart failure
 ?I50.9 - Heart failure, unspecified (ICD-10)
Diabetes
 ?E11.9 - Type 2 diabetes mellitus without complications (ICD-10)
GERD (gastroesophageal reflux disease)
 ?K21.9 - Gastro-esophageal reflux disease without esophagitis (ICD-10)
Hearing loss
 ?H91.90 - Unspecified hearing loss, unspecified ear (ICD-10)
High cholesterol
 ?E78.00 - Pure hypercholesterolemia, unspecified (ICD-10)
Hypertension
 ?I10 - Essential (primary) hypertension (ICD-10)
Kidney failure
 ?N19 - Unspecified kidney failure (ICD-10)
Nicotine dependence
 ?F17.200 - Nicotine dependence, unspecified, uncomplicated (ICD-10)
Obesity
 ?E66.9 - Obesity, unspecified (ICD-10)
Obstructive sleep apnea
 ?G47.33 - Obstructive sleep apnea (adult) (pediatric) (ICD-10)
Osteomyelitis
 ?M86.9 - Osteomyelitis, unspecified (ICD-10)
PVD (peripheral vascular disease)
 ?I73.9 - Peripheral vascular disease, unspecified (ICD-10)
Spinal stenosis
 ?M48.00 - Spinal stenosis, site unspecified (ICD-10)
TIA (transient ischemic attack)
 ?G45.9 - Transient cerebral ischemic attack, unspecified (ICD-10)
Venous thromboembolism
 ?I82.90 - Acute embolism and thrombosis of unspecified vein (ICD-10)


Social History
Smoking status:  Former smoker 



Exam
Narrative
Exam Narrative: 
Nurses note and vital signs reviewed and patient is not hypoxic.

General:  The patient appears well and in no apparent distress.  Patient is resting comfortably on cart.
Skin:  Warm, dry, no pallor noted.  There is no rash noted.
Head:  Normocephalic, atraumatic
Eye: Normal conjunctiva, no drainage
Ears, Nose, Mouth, and Throat: oral mucosa is moist. right tympanic membrane is normal in appearance as is the external canal. The left is obscured by large amount of cerumen.
Cardiovascular:  Regular Rate and Rhythm
Respiratory:  Patient is in no distress, no accessory muscle use, lungs are clear to auscultation, no wheezing, rales or rhonchi
Back:  non-tender
GI:  nontender
Musculoskeletal: The patient has no evidence of calf tenderness, no pitting edema, symmetrical pulses noted bilaterally
Neurological: $
Psychiatric:  Cooperative
Constitutional
Vital Signs, click to edit/add: 

Last Vital Signs

Temp  97.9 F   11/18/23 18:35
Pulse  89   11/18/23 18:35
Resp  18   11/18/23 18:35
BP  130/82   11/18/23 18:35
Pulse Ox  95   11/18/23 18:35
O2 Del Method  Room Air  11/18/23 18:35




Course
Vital Signs
Vital signs: 

Vital Signs

Temperature  97.9 F   11/18/23 18:35
Pulse Rate  89   11/18/23 18:35
Respiratory Rate  18   11/18/23 18:35
Blood Pressure  130/82   11/18/23 18:35
Pulse Oximetry  95   11/18/23 18:35
Oxygen Delivery Method  Room Air  11/18/23 18:35



Temperature  97.9 F   11/18/23 18:35
Pulse Rate  89   11/18/23 18:35
Respiratory Rate  18   11/18/23 18:35
Blood Pressure  130/82   11/18/23 18:35
Pulse Oximetry  95   11/18/23 18:35
Oxygen Delivery Method  Room Air  11/18/23 18:35




Medical Decision Making
[facility] Narrative
Medical decision making narrative: 
Irrigation is ordered and the patient is signed out to Dr. Cabrera.
Differential Diagnosis
Differential Diagnosis: otitis media, otitis externa, cerumen impaction

Discharge Plan
Discharge
Chief Complaint: Ear

Clinical Impression:
 Cerumen impaction


Patient Disposition: Still a Patient

Prescriptions / Home Meds:
No Action
  albuterol sulfate 90 mcg/actuation HFA aerosol inhaler 
   2 inh INHALATION Q6H PRN (Reason: shortness of breath or wheezing) 
  atorvastatin 20 mg tablet 
   20 mg PO DAILY 
  furosemide 40 mg tablet 
   40 mg PO BID 
  insulin aspart U-100 [Novolog FlexPen U-100 Insulin] 100 unit/mL (3 mL) insulin pen 
   80 unit SUBCUT BID 
   Rx Instructions:
   sliding scale coverage 
  lisinopril 2.5 mg tablet 
   2.5 mg PO DAILY 
  metformin 500 mg tablet 
   500 mg PO BID 
  Xarelto 20 mg tablet 
   20 mg PO DAILY 
  sotalol 160 mg tablet 
   160 mg PO BID 
  oxycodone-acetaminophen 5-325 mg tablet 
   1 tab PO Q6H PRN (Reason: pain) 
  polyethylene glycol 3350 17 gram/dose powder 
   17 g PO DAILY 
  tamsulosin 0.4 mg capsule 
   0.4 mg PO Q24H 
  tramadol 50 mg tablet 
       
  docusate sodium 100 mg capsule 
     PO  
  insulin glargine [Lantus Solostar U-100 Insulin] 100 unit/mL (3 mL) insulin pen 
     SUBCUT BID 

Referrals:
JENA TORO [Primary Care Provider] - 1 week

## 2023-11-18 NOTE — ED_ITS
HPI - Ear Problem    
General    
Chief complaint: Ear    
Time Seen by Provider: 11/18/23 18:36    
Source: patient    
Mode of arrival: ambulance    
Limitations: no limitations    
History of Present Illness    
HPI Narrative:     
58-year-old male presents for left ear feeling plugged. He recently had his   
right ear plugged and then it was flushed out. Major fever or so throat and he   
doesn't have symptoms in the right ear. This started within the last day.    
Related Data    
                                Home Medications    
    
    
    
 Medication  Instructions  Recorded  Confirmed    
     
albuterol sulfate 90 mcg/actuation 2 inh inhalation Q6H PRN shortness 10/10/23   
11/18/23    
    
aerosol inhaler of breath or wheezing      
     
atorvastatin 20 mg tablet 20 mg PO DAILY 10/10/23 11/18/23    
     
furosemide 40 mg tablet 40 mg PO BID 10/10/23 11/18/23    
     
insulin aspart U-100 100 unit/mL 80 unit subcut BID 10/10/23 11/18/23    
    
(3 mL) subcutaneous pen (Novolog       
    
FlexPen U-100 Insulin aspart)       
     
lisinopril 2.5 mg tablet 2.5 mg PO DAILY 10/10/23 11/18/23    
     
metformin 500 mg tablet 500 mg PO BID 10/10/23 11/18/23    
     
oxycodone-acetaminophen 5 mg-325 1 tab PO Q6H PRN pain 10/10/23 11/18/23    
    
mg tablet       
     
rivaroxaban 20 mg tablet (Xarelto) 20 mg PO DAILY 10/10/23 11/18/23    
     
sotalol 160 mg tablet 160 mg PO BID 10/10/23 11/18/23    
     
docusate sodium 100 mg capsule mg PO 11/18/23     
     
insulin glargine 100 unit/mL (3 unit subcut BID 11/18/23     
    
mL) subcutaneous pen (Lantus       
    
Solostar U-100 Insulin)       
     
polyethylene glycol 3350 17 17 g PO DAILY 11/18/23 11/18/23    
    
gram/dose oral powder       
     
tamsulosin 0.4 mg capsule 0.4 mg PO Q24H 11/18/23 11/18/23    
     
tramadol 50 mg tablet mg 11/18/23     
    
    
    
                                    Allergies    
    
    
    
Allergy/AdvReac Type Severity Reaction Status Date / Time    
     
amoxicillin Allergy Mild  Verified 10/10/23 12:07    
     
penicillin G Allergy Mild  Verified 10/10/23 12:07    
    
    
    
    
Review of Systems    
    
    
ROS      
    
 Narrative A ten point review of systems is negative except as noted above.       
    
    
PFSH    
PFSH    
Medical History (Updated 11/18/23 @ 19:01 by Mihai Garcia MD)    
    
Amputation of finger, left    
   ?S68.119A - Complete traumatic metacarpophalangeal amputation of unspecified   
   finger, initial encounter (ICD-10)    
Atrial flutter    
   ?I48.92 - Unspecified atrial flutter (ICD-10)    
Cervicalgia    
   ?M54.2 - Cervicalgia (ICD-10)    
Cognitive communication deficit    
   ?R41.841 - Cognitive communication deficit (ICD-10)    
Congestive heart failure    
   ?I50.9 - Heart failure, unspecified (ICD-10)    
Diabetes    
   ?E11.9 - Type 2 diabetes mellitus without complications (ICD-10)    
GERD (gastroesophageal reflux disease)    
   ?K21.9 - Gastro-esophageal reflux disease without esophagitis (ICD-10)    
Hearing loss    
   ?H91.90 - Unspecified hearing loss, unspecified ear (ICD-10)    
High cholesterol    
   ?E78.00 - Pure hypercholesterolemia, unspecified (ICD-10)    
Hypertension    
   ?I10 - Essential (primary) hypertension (ICD-10)    
Kidney failure    
   ?N19 - Unspecified kidney failure (ICD-10)    
Nicotine dependence    
   ?F17.200 - Nicotine dependence, unspecified, uncomplicated (ICD-10)    
Obesity    
   ?E66.9 - Obesity, unspecified (ICD-10)    
Obstructive sleep apnea    
   ?G47.33 - Obstructive sleep apnea (adult) (pediatric) (ICD-10)    
Osteomyelitis    
   ?M86.9 - Osteomyelitis, unspecified (ICD-10)    
PVD (peripheral vascular disease)    
   ?I73.9 - Peripheral vascular disease, unspecified (ICD-10)    
Spinal stenosis    
   ?M48.00 - Spinal stenosis, site unspecified (ICD-10)    
TIA (transient ischemic attack)    
   ?G45.9 - Transient cerebral ischemic attack, unspecified (ICD-10)    
Venous thromboembolism    
   ?I82.90 - Acute embolism and thrombosis of unspecified vein (ICD-10)    
    
    
Social History    
Smoking status:  Former smoker     
    
    
    
Exam    
Narrative    
Exam Narrative:     
Nurses note and vital signs reviewed and patient is not hypoxic.    
    
General:  The patient appears well and in no apparent distress.  Patient is   
resting comfortably on cart.    
Skin:  Warm, dry, no pallor noted.  There is no rash noted.    
Head:  Normocephalic, atraumatic    
Eye: Normal conjunctiva, no drainage    
Ears, Nose, Mouth, and Throat: oral mucosa is moist. right tympanic membrane is   
normal in appearance as is the external canal. The left is obscured by large   
amount of cerumen.    
Cardiovascular:  Regular Rate and Rhythm    
Respiratory:  Patient is in no distress, no accessory muscle use, lungs are   
clear to auscultation, no wheezing, rales or rhonchi    
Back:  non-tender    
GI:  nontender    
Musculoskeletal: The patient has no evidence of calf tenderness, no pitting   
edema, symmetrical pulses noted bilaterally    
Neurological: $    
Psychiatric:  Cooperative    
Constitutional    
Vital Signs, click to edit/add:     
    
                                Last Vital Signs    
    
    
    
Temp  97.9 F   11/18/23 18:35    
     
Pulse  89   11/18/23 18:35    
     
Resp  18   11/18/23 18:35    
     
BP  130/82   11/18/23 18:35    
     
Pulse Ox  95   11/18/23 18:35    
     
O2 Del Method  Room Air  11/18/23 18:35    
    
    
    
    
    
Course    
Vital Signs    
Vital signs:     
    
                                   Vital Signs    
    
    
    
Temperature  97.9 F   11/18/23 18:35    
     
Pulse Rate  89   11/18/23 18:35    
     
Respiratory Rate  18   11/18/23 18:35    
     
Blood Pressure  130/82   11/18/23 18:35    
     
Pulse Oximetry  95   11/18/23 18:35    
     
Oxygen Delivery Method  Room Air  11/18/23 18:35    
    
    
                                            
    
    
    
Temperature  97.9 F   11/18/23 18:35    
     
Pulse Rate  89   11/18/23 18:35    
     
Respiratory Rate  18   11/18/23 18:35    
     
Blood Pressure  130/82   11/18/23 18:35    
     
Pulse Oximetry  95   11/18/23 18:35    
     
Oxygen Delivery Method  Room Air  11/18/23 18:35    
    
    
    
    
    
Medical Decision Making    
[facility] Narrative    
Medical decision making narrative:     
Irrigation is ordered and the patient is signed out to Dr. Cabrera.    
Differential Diagnosis    
Differential Diagnosis: otitis media, otitis externa, cerumen impaction    
    
Discharge Plan    
Discharge    
Chief Complaint: Ear    
    
Clinical Impression:    
 Cerumen impaction    
    
    
Patient Disposition: Still a Patient    
    
Prescriptions / Home Meds:    
No Action    
  albuterol sulfate 90 mcg/actuation HFA aerosol inhaler     
   2 inh INHALATION Q6H PRN (Reason: shortness of breath or wheezing)     
  atorvastatin 20 mg tablet     
   20 mg PO DAILY     
  furosemide 40 mg tablet     
   40 mg PO BID     
  insulin aspart U-100 [Novolog FlexPen U-100 Insulin] 100 unit/mL (3 mL)   
insulin pen     
   80 unit SUBCUT BID     
   Rx Instructions:    
   sliding scale coverage     
  lisinopril 2.5 mg tablet     
   2.5 mg PO DAILY     
  metformin 500 mg tablet     
   500 mg PO BID     
  Xarelto 20 mg tablet     
   20 mg PO DAILY     
  sotalol 160 mg tablet     
   160 mg PO BID     
  oxycodone-acetaminophen 5-325 mg tablet     
   1 tab PO Q6H PRN (Reason: pain)     
  polyethylene glycol 3350 17 gram/dose powder     
   17 g PO DAILY     
  tamsulosin 0.4 mg capsule     
   0.4 mg PO Q24H     
  tramadol 50 mg tablet     
           
  docusate sodium 100 mg capsule     
     PO      
  insulin glargine [Lantus Solostar U-100 Insulin] 100 unit/mL (3 mL) insulin   
pen     
     SUBCUT BID     
    
Referrals:    
JENA TORO [Primary Care Provider] - 1 week

## 2023-11-29 ENCOUNTER — HOSPITAL ENCOUNTER (OUTPATIENT)
Dept: HOSPITAL 101 - LAB | Age: 58
Discharge: HOME | End: 2023-11-29
Payer: COMMERCIAL

## 2023-11-29 DIAGNOSIS — N40.1: Primary | ICD-10-CM

## 2023-11-29 PROCEDURE — 84153 ASSAY OF PSA TOTAL: CPT

## 2023-11-29 PROCEDURE — 36415 COLL VENOUS BLD VENIPUNCTURE: CPT

## 2023-11-29 PROCEDURE — 84154 ASSAY OF PSA FREE: CPT

## 2023-11-30 PROBLEM — E66.01 MORBID OBESITY (MULTI): Status: ACTIVE | Noted: 2023-01-01

## 2023-11-30 PROBLEM — E66.01 OBESITY, CLASS III, BMI 40-49.9 (MORBID OBESITY) (MULTI): Status: ACTIVE | Noted: 2017-10-23

## 2023-11-30 PROBLEM — Z98.890 H/O EXCISION OF LAMINA OF CERVICAL VERTEBRA FOR DECOMPRESSION OF SPINAL CORD: Status: ACTIVE | Noted: 2023-01-01

## 2023-11-30 PROBLEM — E11.9 DIABETES MELLITUS (MULTI): Status: ACTIVE | Noted: 2023-01-01

## 2023-11-30 PROBLEM — E78.5 HYPERLIPIDEMIA: Status: ACTIVE | Noted: 2023-01-01

## 2023-11-30 PROBLEM — A04.72 C. DIFFICILE COLITIS: Status: ACTIVE | Noted: 2023-01-01

## 2023-11-30 PROBLEM — K21.9 GERD (GASTROESOPHAGEAL REFLUX DISEASE): Status: ACTIVE | Noted: 2023-01-01

## 2023-11-30 PROBLEM — R00.2 PALPITATIONS: Status: ACTIVE | Noted: 2023-01-01

## 2023-11-30 PROBLEM — M54.9 BACK PAIN: Status: ACTIVE | Noted: 2023-01-01

## 2023-11-30 PROBLEM — I48.92 ATRIAL FLUTTER, PAROXYSMAL (MULTI): Status: ACTIVE | Noted: 2023-01-01

## 2023-11-30 PROBLEM — L03.90 CELLULITIS: Status: ACTIVE | Noted: 2023-01-01

## 2023-11-30 PROBLEM — G47.33 OBSTRUCTIVE SLEEP APNEA, ADULT: Status: ACTIVE | Noted: 2023-01-01

## 2023-11-30 PROBLEM — G47.30 SLEEP APNEA: Status: ACTIVE | Noted: 2023-01-01

## 2023-11-30 PROBLEM — G45.9 TIA (TRANSIENT ISCHEMIC ATTACK): Status: ACTIVE | Noted: 2023-01-01

## 2023-11-30 LAB — PSA, FREE: 0.12 NG/ML

## 2024-01-01 ENCOUNTER — APPOINTMENT (OUTPATIENT)
Dept: GENERAL RADIOLOGY | Age: 59
DRG: 871 | End: 2024-01-01
Payer: COMMERCIAL

## 2024-01-01 ENCOUNTER — HOSPITAL ENCOUNTER (INPATIENT)
Age: 59
LOS: 1 days | DRG: 871 | End: 2024-02-27
Attending: EMERGENCY MEDICINE | Admitting: INTERNAL MEDICINE
Payer: COMMERCIAL

## 2024-01-01 ENCOUNTER — APPOINTMENT (OUTPATIENT)
Dept: CT IMAGING | Age: 59
DRG: 871 | End: 2024-01-01
Payer: COMMERCIAL

## 2024-01-01 ENCOUNTER — APPOINTMENT (OUTPATIENT)
Dept: ULTRASOUND IMAGING | Age: 59
DRG: 871 | End: 2024-01-01
Payer: COMMERCIAL

## 2024-01-01 ENCOUNTER — HOSPITAL ENCOUNTER (EMERGENCY)
Age: 59
Discharge: ANOTHER HOSPITAL | End: 2024-02-25
Attending: FAMILY MEDICINE
Payer: COMMERCIAL

## 2024-01-01 ENCOUNTER — APPOINTMENT (OUTPATIENT)
Age: 59
DRG: 871 | End: 2024-01-01
Payer: COMMERCIAL

## 2024-01-01 ENCOUNTER — APPOINTMENT (OUTPATIENT)
Dept: GENERAL RADIOLOGY | Age: 59
End: 2024-01-01
Payer: COMMERCIAL

## 2024-01-01 VITALS
OXYGEN SATURATION: 93 % | WEIGHT: 315 LBS | SYSTOLIC BLOOD PRESSURE: 71 MMHG | DIASTOLIC BLOOD PRESSURE: 51 MMHG | RESPIRATION RATE: 16 BRPM | HEART RATE: 68 BPM | BODY MASS INDEX: 48.26 KG/M2

## 2024-01-01 VITALS
BODY MASS INDEX: 44.1 KG/M2 | TEMPERATURE: 98.1 F | HEART RATE: 130 BPM | RESPIRATION RATE: 14 BRPM | DIASTOLIC BLOOD PRESSURE: 54 MMHG | HEIGHT: 71 IN | SYSTOLIC BLOOD PRESSURE: 105 MMHG | OXYGEN SATURATION: 94 % | WEIGHT: 315 LBS

## 2024-01-01 DIAGNOSIS — D72.829 LEUKOCYTOSIS, UNSPECIFIED TYPE: ICD-10-CM

## 2024-01-01 DIAGNOSIS — I46.9 CARDIAC ARREST (HCC): Primary | ICD-10-CM

## 2024-01-01 DIAGNOSIS — I46.9 CARDIOPULMONARY ARREST WITH SUCCESSFUL RESUSCITATION (HCC): Primary | ICD-10-CM

## 2024-01-01 LAB
ABSOLUTE BANDS: 1.28 K/UL (ref 0–1)
ALBUMIN SERPL-MCNC: 2.4 G/DL (ref 3.5–5.2)
ALBUMIN SERPL-MCNC: 3.6 G/DL (ref 3.5–5.2)
ALBUMIN/GLOB SERPL: 0.8 {RATIO} (ref 1–2.5)
ALBUMIN/GLOB SERPL: 1 {RATIO} (ref 1–2.5)
ALLEN TEST: ABNORMAL
ALLEN TEST: POSITIVE
ALP SERPL-CCNC: 166 U/L (ref 40–129)
ALP SERPL-CCNC: 276 U/L (ref 40–129)
ALT SERPL-CCNC: 2115 U/L (ref 5–41)
ALT SERPL-CCNC: 2185 U/L (ref 5–41)
AMORPH SED URNS QL MICRO: ABNORMAL
ANA SER QL IA: NEGATIVE
ANION GAP SERPL CALCULATED.3IONS-SCNC: 14 MMOL/L (ref 9–17)
ANION GAP SERPL CALCULATED.3IONS-SCNC: 15 MMOL/L (ref 9–17)
ANION GAP SERPL CALCULATED.3IONS-SCNC: 15 MMOL/L (ref 9–17)
ANION GAP SERPL CALCULATED.3IONS-SCNC: 17 MMOL/L (ref 9–17)
ANION GAP SERPL CALCULATED.3IONS-SCNC: 17 MMOL/L (ref 9–17)
ANION GAP SERPL CALCULATED.3IONS-SCNC: 21 MMOL/L (ref 9–17)
ANTI-XA UNFRAC HEPARIN: 0.45 IU/L
ANTI-XA UNFRAC HEPARIN: 0.45 IU/L
AST SERPL-CCNC: 1324 U/L
AST SERPL-CCNC: 1730 U/L
BANDS: 4 % (ref 0–10)
BASOPHILS # BLD: 0 K/UL (ref 0–0.2)
BASOPHILS # BLD: 0 K/UL (ref 0–0.2)
BASOPHILS # BLD: ABNORMAL K/UL (ref 0–0.2)
BASOPHILS NFR BLD: 0 % (ref 0–2)
BASOPHILS NFR BLD: 0 % (ref 0–2)
BASOPHILS NFR BLD: ABNORMAL % (ref 0–2)
BILIRUB DIRECT SERPL-MCNC: 0.6 MG/DL
BILIRUB INDIRECT SERPL-MCNC: 0.4 MG/DL (ref 0–1)
BILIRUB SERPL-MCNC: 0.5 MG/DL (ref 0.3–1.2)
BILIRUB SERPL-MCNC: 1 MG/DL (ref 0.3–1.2)
BILIRUB UR QL STRIP: NEGATIVE
BUN BLD-MCNC: 42 MG/DL (ref 8–26)
BUN SERPL-MCNC: 35 MG/DL (ref 6–20)
BUN SERPL-MCNC: 44 MG/DL (ref 6–20)
BUN SERPL-MCNC: 48 MG/DL (ref 6–20)
BUN SERPL-MCNC: 50 MG/DL (ref 6–20)
BUN SERPL-MCNC: 53 MG/DL (ref 6–20)
BUN SERPL-MCNC: 57 MG/DL (ref 6–20)
BUN SERPL-MCNC: 57 MG/DL (ref 6–20)
BUN SERPL-MCNC: 60 MG/DL (ref 6–20)
BUN/CREAT SERPL: 18 (ref 9–20)
C DIFF GDH + TOXINS A+B STL QL IA.RAPID: NEGATIVE
C3 SERPL-MCNC: 119 MG/DL (ref 90–180)
C4 SERPL-MCNC: 16 MG/DL (ref 10–40)
CA-I BLD-SCNC: 0.97 MMOL/L (ref 1.13–1.33)
CA-I BLD-SCNC: 0.98 MMOL/L (ref 1.13–1.33)
CA-I BLD-SCNC: 0.99 MMOL/L (ref 1.15–1.33)
CA-I BLD-SCNC: 1.05 MMOL/L (ref 1.13–1.33)
CALCIUM SERPL-MCNC: 7.3 MG/DL (ref 8.6–10.4)
CALCIUM SERPL-MCNC: 7.5 MG/DL (ref 8.6–10.4)
CALCIUM SERPL-MCNC: 7.7 MG/DL (ref 8.6–10.4)
CALCIUM SERPL-MCNC: 7.9 MG/DL (ref 8.6–10.4)
CALCIUM SERPL-MCNC: 8.1 MG/DL (ref 8.6–10.4)
CALCIUM SERPL-MCNC: 8.5 MG/DL (ref 8.6–10.4)
CAMPYLOBACTER DNA SPEC NAA+PROBE: NORMAL
CASTS #/AREA URNS LPF: ABNORMAL /LPF (ref 0–2)
CASTS #/AREA URNS LPF: ABNORMAL /LPF (ref 0–2)
CHLORIDE BLD-SCNC: 102 MMOL/L (ref 98–107)
CHLORIDE SERPL-SCNC: 100 MMOL/L (ref 98–107)
CHLORIDE SERPL-SCNC: 102 MMOL/L (ref 98–107)
CHLORIDE SERPL-SCNC: 103 MMOL/L (ref 98–107)
CHLORIDE SERPL-SCNC: 91 MMOL/L (ref 98–107)
CHLORIDE SERPL-SCNC: 92 MMOL/L (ref 98–107)
CHLORIDE SERPL-SCNC: 95 MMOL/L (ref 98–107)
CLARITY UR: ABNORMAL
CO2 BLD CALC-SCNC: 27 MMOL/L (ref 22–30)
CO2 SERPL-SCNC: 16 MMOL/L (ref 20–31)
CO2 SERPL-SCNC: 17 MMOL/L (ref 20–31)
CO2 SERPL-SCNC: 18 MMOL/L (ref 20–31)
CO2 SERPL-SCNC: 20 MMOL/L (ref 20–31)
CO2 SERPL-SCNC: 21 MMOL/L (ref 20–31)
CO2 SERPL-SCNC: 21 MMOL/L (ref 20–31)
CO2 SERPL-SCNC: 24 MMOL/L (ref 20–31)
CO2 SERPL-SCNC: 25 MMOL/L (ref 20–31)
COLOR UR: YELLOW
CREAT SERPL-MCNC: 1.9 MG/DL (ref 0.7–1.2)
CREAT SERPL-MCNC: 2.5 MG/DL (ref 0.7–1.2)
CREAT SERPL-MCNC: 2.5 MG/DL (ref 0.7–1.2)
CREAT SERPL-MCNC: 2.7 MG/DL (ref 0.7–1.2)
CREAT SERPL-MCNC: 2.8 MG/DL (ref 0.7–1.2)
CREAT SERPL-MCNC: 2.8 MG/DL (ref 0.7–1.2)
CREAT SERPL-MCNC: 2.9 MG/DL (ref 0.7–1.2)
CREAT SERPL-MCNC: 3.2 MG/DL (ref 0.7–1.2)
CREAT UR-MCNC: 48.4 MG/DL (ref 39–259)
CRYSTALS URNS MICRO: ABNORMAL /HPF
CRYSTALS URNS MICRO: ABNORMAL /HPF
DSDNA IGG SER QL IA: 1.7 IU/ML
ECHO AO ROOT DIAM: 3.8 CM
ECHO AO ROOT INDEX: 1.46 CM/M2
ECHO AV AREA PEAK VELOCITY: 2.7 CM2
ECHO AV AREA VTI: 3 CM2
ECHO AV AREA/BSA PEAK VELOCITY: 1 CM2/M2
ECHO AV AREA/BSA VTI: 1.2 CM2/M2
ECHO AV MEAN GRADIENT: 3 MMHG
ECHO AV MEAN VELOCITY: 0.8 M/S
ECHO AV PEAK GRADIENT: 6 MMHG
ECHO AV PEAK VELOCITY: 1.2 M/S
ECHO AV VELOCITY RATIO: 0.75
ECHO AV VTI: 12.8 CM
ECHO LA AREA 2C: 18.7 CM2
ECHO LA AREA 4C: 20 CM2
ECHO LA DIAMETER INDEX: 0.96 CM/M2
ECHO LA DIAMETER: 2.5 CM
ECHO LA MAJOR AXIS: 6.6 CM
ECHO LA MINOR AXIS: 5.7 CM
ECHO LA TO AORTIC ROOT RATIO: 0.66
ECHO LA VOL BP: 53 ML (ref 18–58)
ECHO LA VOL MOD A2C: 51 ML (ref 18–58)
ECHO LA VOL MOD A4C: 48 ML (ref 18–58)
ECHO LA VOL/BSA BIPLANE: 20 ML/M2 (ref 16–34)
ECHO LA VOLUME INDEX MOD A2C: 20 ML/M2 (ref 16–34)
ECHO LA VOLUME INDEX MOD A4C: 18 ML/M2 (ref 16–34)
ECHO LV E' LATERAL VELOCITY: 14 CM/S
ECHO LV E' SEPTAL VELOCITY: 9 CM/S
ECHO LV EDV A2C: 20 ML
ECHO LV EDV A4C: 18 ML
ECHO LV EDV INDEX A4C: 7 ML/M2
ECHO LV EDV NDEX A2C: 8 ML/M2
ECHO LV EJECTION FRACTION A2C: 52 %
ECHO LV EJECTION FRACTION A4C: 50 %
ECHO LV EJECTION FRACTION BIPLANE: 50 % (ref 55–100)
ECHO LV ESV A2C: 10 ML
ECHO LV ESV A4C: 9 ML
ECHO LV ESV INDEX A2C: 4 ML/M2
ECHO LV ESV INDEX A4C: 3 ML/M2
ECHO LV FRACTIONAL SHORTENING: 33 % (ref 28–44)
ECHO LV INTERNAL DIMENSION DIASTOLE INDEX: 1.38 CM/M2
ECHO LV INTERNAL DIMENSION DIASTOLIC: 3.6 CM (ref 4.2–5.9)
ECHO LV INTERNAL DIMENSION SYSTOLIC INDEX: 0.92 CM/M2
ECHO LV INTERNAL DIMENSION SYSTOLIC: 2.4 CM
ECHO LV IVSD: 1.2 CM (ref 0.6–1)
ECHO LV MASS 2D: 141.5 G (ref 88–224)
ECHO LV MASS INDEX 2D: 54.4 G/M2 (ref 49–115)
ECHO LV POSTERIOR WALL DIASTOLIC: 1.2 CM (ref 0.6–1)
ECHO LV RELATIVE WALL THICKNESS RATIO: 0.67
ECHO LVOT AREA: 3.5 CM2
ECHO LVOT AV VTI INDEX: 0.87
ECHO LVOT DIAM: 2.1 CM
ECHO LVOT MEAN GRADIENT: 2 MMHG
ECHO LVOT PEAK GRADIENT: 4 MMHG
ECHO LVOT PEAK VELOCITY: 0.9 M/S
ECHO LVOT STROKE VOLUME INDEX: 14.8 ML/M2
ECHO LVOT SV: 38.4 ML
ECHO LVOT VTI: 11.1 CM
ECHO MV A VELOCITY: 0.4 M/S
ECHO MV AREA VTI: 2.2 CM2
ECHO MV E DECELERATION TIME (DT): 169 MS
ECHO MV E VELOCITY: 0.74 M/S
ECHO MV E/A RATIO: 1.85
ECHO MV E/E' LATERAL: 5.29
ECHO MV E/E' RATIO (AVERAGED): 6.75
ECHO MV LVOT VTI INDEX: 1.54
ECHO MV MAX VELOCITY: 0.7 M/S
ECHO MV MEAN GRADIENT: 1 MMHG
ECHO MV MEAN VELOCITY: 0.5 M/S
ECHO MV PEAK GRADIENT: 2 MMHG
ECHO MV VTI: 17.1 CM
ECHO PV MAX VELOCITY: 1.1 M/S
ECHO PV PEAK GRADIENT: 4 MMHG
ECHO RA AREA 4C: 24 CM2
ECHO RV BASAL DIMENSION: 4.7 CM
ECHO RV FREE WALL PEAK S': 12 CM/S
ECHO RV MID DIMENSION: 3.5 CM
ECHO RV TAPSE: 1.4 CM (ref 1.7–?)
EGFR, POC: 29 ML/MIN/1.73M2
EKG ATRIAL RATE: 92 BPM
EKG P AXIS: -102 DEGREES
EKG P-R INTERVAL: 280 MS
EKG Q-T INTERVAL: 402 MS
EKG QRS DURATION: 142 MS
EKG QTC CALCULATION (BAZETT): 497 MS
EKG R AXIS: 79 DEGREES
EKG T AXIS: 69 DEGREES
EKG VENTRICULAR RATE: 92 BPM
EOSINOPHIL # BLD: 0 K/UL (ref 0–0.4)
EOSINOPHIL # BLD: 0 K/UL (ref 0–0.4)
EOSINOPHIL # BLD: ABNORMAL K/UL (ref 0–0.4)
EOSINOPHILS RELATIVE PERCENT: 0 % (ref 1–4)
EOSINOPHILS RELATIVE PERCENT: 0 % (ref 1–4)
EOSINOPHILS RELATIVE PERCENT: ABNORMAL % (ref 0–5)
EPI CELLS #/AREA URNS HPF: ABNORMAL /HPF (ref 0–5)
ERYTHROCYTE [DISTWIDTH] IN BLOOD BY AUTOMATED COUNT: 15.2 % (ref 12.1–15.2)
ERYTHROCYTE [DISTWIDTH] IN BLOOD BY AUTOMATED COUNT: 15.4 % (ref 11.8–14.4)
ERYTHROCYTE [DISTWIDTH] IN BLOOD BY AUTOMATED COUNT: 15.5 % (ref 11.8–14.4)
ETEC ELTA+ESTB GENES STL QL NAA+PROBE: NORMAL
FIO2: 100
FIO2: 60
FREE KAPPA/LAMBDA RATIO: 1.83 (ref 0.26–1.65)
GASTROCULT GAST QL: POSITIVE
GFR SERPL CREATININE-BSD FRML MDRD: 22 ML/MIN/1.73M2
GFR SERPL CREATININE-BSD FRML MDRD: 24 ML/MIN/1.73M2
GFR SERPL CREATININE-BSD FRML MDRD: 25 ML/MIN/1.73M2
GFR SERPL CREATININE-BSD FRML MDRD: 25 ML/MIN/1.73M2
GFR SERPL CREATININE-BSD FRML MDRD: 26 ML/MIN/1.73M2
GFR SERPL CREATININE-BSD FRML MDRD: 29 ML/MIN/1.73M2
GFR SERPL CREATININE-BSD FRML MDRD: 29 ML/MIN/1.73M2
GFR SERPL CREATININE-BSD FRML MDRD: 40 ML/MIN/1.73M2
GLUCOSE BLD-MCNC: 174 MG/DL (ref 74–100)
GLUCOSE BLD-MCNC: 189 MG/DL (ref 74–100)
GLUCOSE BLD-MCNC: 191 MG/DL (ref 74–100)
GLUCOSE BLD-MCNC: 193 MG/DL (ref 75–110)
GLUCOSE BLD-MCNC: 198 MG/DL (ref 74–100)
GLUCOSE BLD-MCNC: 207 MG/DL (ref 75–110)
GLUCOSE BLD-MCNC: 213 MG/DL (ref 75–110)
GLUCOSE BLD-MCNC: 215 MG/DL (ref 74–100)
GLUCOSE BLD-MCNC: 232 MG/DL (ref 74–100)
GLUCOSE BLD-MCNC: 238 MG/DL (ref 74–100)
GLUCOSE BLD-MCNC: 245 MG/DL (ref 75–110)
GLUCOSE BLD-MCNC: 258 MG/DL (ref 75–110)
GLUCOSE BLD-MCNC: 265 MG/DL (ref 75–110)
GLUCOSE SERPL-MCNC: 175 MG/DL (ref 70–99)
GLUCOSE SERPL-MCNC: 199 MG/DL (ref 70–99)
GLUCOSE SERPL-MCNC: 211 MG/DL (ref 70–99)
GLUCOSE SERPL-MCNC: 225 MG/DL (ref 70–99)
GLUCOSE SERPL-MCNC: 227 MG/DL (ref 70–99)
GLUCOSE SERPL-MCNC: 231 MG/DL (ref 70–99)
GLUCOSE SERPL-MCNC: 240 MG/DL (ref 70–99)
GLUCOSE SERPL-MCNC: 242 MG/DL (ref 70–99)
GLUCOSE UR STRIP-MCNC: NEGATIVE MG/DL
HAV IGM SERPL QL IA: NONREACTIVE
HBV CORE IGM SERPL QL IA: NONREACTIVE
HBV SURFACE AG SERPL QL IA: NONREACTIVE
HCO3 VENOUS: 26.6 MMOL/L (ref 22–29)
HCO3 VENOUS: 5 MMOL/L (ref 22–29)
HCT VFR BLD AUTO: 41.9 % (ref 40.7–50.3)
HCT VFR BLD AUTO: 43.9 % (ref 41–53)
HCT VFR BLD AUTO: 50.6 % (ref 40.7–50.3)
HCT VFR BLD AUTO: 52 % (ref 41–53)
HCV AB SERPL QL IA: NONREACTIVE
HGB BLD-MCNC: 13.3 G/DL (ref 13–17)
HGB BLD-MCNC: 13.4 G/DL (ref 13.5–17.5)
HGB BLD-MCNC: 15.4 G/DL (ref 13–17)
HGB UR QL STRIP.AUTO: ABNORMAL
IMM GRANULOCYTES # BLD AUTO: 0.51 K/UL (ref 0–0.3)
IMM GRANULOCYTES # BLD AUTO: 1.31 K/UL (ref 0–0.3)
IMM GRANULOCYTES # BLD AUTO: ABNORMAL K/UL (ref 0–0.3)
IMM GRANULOCYTES NFR BLD: 2 %
IMM GRANULOCYTES NFR BLD: 3 %
IMM GRANULOCYTES NFR BLD: ABNORMAL %
INR PPP: 1.5
INR PPP: 1.6
INTERPRETATION SERPL IFE-IMP: NORMAL
KAPPA LC FREE SER-MCNC: 59.3 MG/L (ref 3.7–19.4)
KETONES UR STRIP-MCNC: NEGATIVE MG/DL
LACTIC ACID, WHOLE BLOOD: 1.9 MMOL/L (ref 0.7–2.1)
LACTIC ACID, WHOLE BLOOD: 2.7 MMOL/L (ref 0.7–2.1)
LACTIC ACID, WHOLE BLOOD: 4.3 MMOL/L (ref 0.7–2.1)
LAMBDA LC FREE SERPL-MCNC: 32.4 MG/L (ref 5.7–26.3)
LEUKOCYTE ESTERASE UR QL STRIP: ABNORMAL
LYMPHOCYTES NFR BLD: 3.04 K/UL (ref 1–4.8)
LYMPHOCYTES NFR BLD: 6.56 K/UL (ref 1–4.8)
LYMPHOCYTES NFR BLD: 7.98 K/UL (ref 1–4.8)
LYMPHOCYTES RELATIVE PERCENT: 12 % (ref 24–44)
LYMPHOCYTES RELATIVE PERCENT: 15 % (ref 24–44)
LYMPHOCYTES RELATIVE PERCENT: 25 % (ref 13–44)
MAGNESIUM SERPL-MCNC: 1.7 MG/DL (ref 1.6–2.6)
MAGNESIUM SERPL-MCNC: 1.9 MG/DL (ref 1.6–2.6)
MAGNESIUM SERPL-MCNC: 1.9 MG/DL (ref 1.6–2.6)
MAGNESIUM SERPL-MCNC: 2 MG/DL (ref 1.6–2.6)
MCH RBC QN AUTO: 28.2 PG (ref 25.2–33.5)
MCH RBC QN AUTO: 28.6 PG (ref 25.2–33.5)
MCH RBC QN AUTO: 28.9 PG (ref 26–34)
MCHC RBC AUTO-ENTMCNC: 30.4 G/DL (ref 28.4–34.8)
MCHC RBC AUTO-ENTMCNC: 30.5 G/DL (ref 31–37)
MCHC RBC AUTO-ENTMCNC: 31.7 G/DL (ref 28.4–34.8)
MCV RBC AUTO: 89 FL (ref 82.6–102.9)
MCV RBC AUTO: 93.9 FL (ref 82.6–102.9)
MCV RBC AUTO: 94.6 FL (ref 80–100)
MODE: ABNORMAL
MODE: ABNORMAL
MODE: AC
MONOCYTES NFR BLD: 1.27 K/UL (ref 0.1–0.8)
MONOCYTES NFR BLD: 2.87 K/UL (ref 0–1)
MONOCYTES NFR BLD: 3.93 K/UL (ref 0.1–0.8)
MONOCYTES NFR BLD: 5 % (ref 1–7)
MONOCYTES NFR BLD: 9 % (ref 1–7)
MONOCYTES NFR BLD: 9 % (ref 5–9)
MORPHOLOGY: ABNORMAL
MRSA, DNA, NASAL: NEGATIVE
NEGATIVE BASE EXCESS, ART: 2.8 MMOL/L (ref 0–2)
NEGATIVE BASE EXCESS, ART: 3.4 MMOL/L (ref 0–2)
NEGATIVE BASE EXCESS, ART: 3.7 MMOL/L (ref 0–2)
NEGATIVE BASE EXCESS, ART: 5.2 MMOL/L (ref 0–2)
NEGATIVE BASE EXCESS, ART: 5.3 MMOL/L (ref 0–2)
NEGATIVE BASE EXCESS, ART: 5.5 MMOL/L (ref 0–2)
NEGATIVE BASE EXCESS, ART: 5.9 MMOL/L (ref 0–2)
NEGATIVE BASE EXCESS, ART: 7.4 MMOL/L (ref 0–2)
NEGATIVE BASE EXCESS, VEN: 22.6 MMOL/L (ref 0–2)
NEGATIVE BASE EXCESS, VEN: 5.3 MMOL/L (ref 0–2)
NEUTROPHILS NFR BLD: 62 % (ref 39–75)
NEUTROPHILS NFR BLD: 73 % (ref 36–66)
NEUTROPHILS NFR BLD: 81 % (ref 36–66)
NEUTS SEG NFR BLD: 19.77 K/UL (ref 2.1–6.5)
NEUTS SEG NFR BLD: 20.48 K/UL (ref 1.8–7.7)
NEUTS SEG NFR BLD: 31.9 K/UL (ref 1.8–7.7)
NITRITE UR QL STRIP: NEGATIVE
NRBC BLD-RTO: 0.3 PER 100 WBC
NRBC BLD-RTO: 1.1 PER 100 WBC
NUCLEAR IGG SER IA-RTO: 0.2 U/ML
NUCLEATED RED BLOOD CELLS: 1 PER 100 WBC
O2 DELIVERY DEVICE: ABNORMAL
O2 SAT, VEN: 72.3 % (ref 60–85)
O2 SAT, VEN: 72.9 % (ref 60–85)
P SHIGELLOIDES DNA STL QL NAA+PROBE: NORMAL
PARTIAL THROMBOPLASTIN TIME: 29.3 SEC (ref 23–36.5)
PARTIAL THROMBOPLASTIN TIME: 31.3 SEC (ref 23–36.5)
PARTIAL THROMBOPLASTIN TIME: 35.6 SEC (ref 23.9–33.8)
PATH REV: NORMAL
PATIENT TEMP: 39.4
PATIENT TEMP: 39.9
PCO2, VEN: 17.5 MM HG (ref 41–51)
PCO2, VEN: 79.2 MM HG (ref 41–51)
PH UR STRIP: 5 [PH] (ref 5–8)
PH VENOUS: 7.06 (ref 7.32–7.43)
PH VENOUS: 7.13 (ref 7.32–7.43)
PHOSPHATE SERPL-MCNC: 3.3 MG/DL (ref 2.5–4.5)
PHOSPHATE SERPL-MCNC: 4.3 MG/DL (ref 2.5–4.5)
PHOSPHATE SERPL-MCNC: 4.9 MG/DL (ref 2.5–4.5)
PLATELET # BLD AUTO: 175 K/UL (ref 138–453)
PLATELET # BLD AUTO: 313 K/UL (ref 140–450)
PLATELET # BLD AUTO: 380 K/UL (ref 138–453)
PMV BLD AUTO: 11 FL (ref 8.1–13.5)
PMV BLD AUTO: 11.2 FL (ref 8.1–13.5)
PMV BLD AUTO: 11.4 FL (ref 6–12)
PO2, VEN: 51.4 MM HG (ref 30–50)
PO2, VEN: 52.6 MM HG (ref 30–50)
POC ANION GAP: 5 MMOL/L (ref 7–16)
POC CREATININE: 2.5 MG/DL (ref 0.51–1.19)
POC HCO3: 18.5 MMOL/L (ref 21–28)
POC HCO3: 19.2 MMOL/L (ref 21–28)
POC HCO3: 19.3 MMOL/L (ref 21–28)
POC HCO3: 21.1 MMOL/L (ref 21–28)
POC HCO3: 21.9 MMOL/L (ref 21–28)
POC HCO3: 24.2 MMOL/L (ref 21–28)
POC HCO3: 24.6 MMOL/L (ref 21–28)
POC HCO3: 25.2 MMOL/L (ref 21–28)
POC HEMOGLOBIN (CALC): 17.8 G/DL (ref 13.5–17.5)
POC LACTIC ACID: 2 MMOL/L (ref 0.56–1.39)
POC LACTIC ACID: 4.8 MMOL/L (ref 0.56–1.39)
POC O2 SATURATION: 86.5 % (ref 94–98)
POC O2 SATURATION: 91.2 % (ref 94–98)
POC O2 SATURATION: 91.5 % (ref 94–98)
POC O2 SATURATION: 92.9 % (ref 94–98)
POC O2 SATURATION: 93.6 % (ref 94–98)
POC O2 SATURATION: 94.1 % (ref 94–98)
POC O2 SATURATION: 94.3 % (ref 94–98)
POC O2 SATURATION: 98 % (ref 94–98)
POC PCO2 TEMP: 39.3 MM HG
POC PCO2 TEMP: 53.9 MM HG
POC PCO2: 28.4 MM HG (ref 35–48)
POC PCO2: 34.6 MM HG (ref 35–48)
POC PCO2: 38 MM HG (ref 35–48)
POC PCO2: 42.8 MM HG (ref 35–48)
POC PCO2: 48.5 MM HG (ref 35–48)
POC PCO2: 50.7 MM HG (ref 35–48)
POC PCO2: 59.1 MM HG (ref 35–48)
POC PCO2: 66.2 MM HG (ref 35–48)
POC PH TEMP: 7.27
POC PH TEMP: 7.31
POC PH: 7.18 (ref 7.35–7.45)
POC PH: 7.24 (ref 7.35–7.45)
POC PH: 7.24 (ref 7.35–7.45)
POC PH: 7.29 (ref 7.35–7.45)
POC PH: 7.3 (ref 7.35–7.45)
POC PH: 7.31 (ref 7.35–7.45)
POC PH: 7.35 (ref 7.35–7.45)
POC PH: 7.44 (ref 7.35–7.45)
POC PO2 TEMP: 84.2 MM HG
POC PO2 TEMP: 90.1 MM HG
POC PO2: 114.6 MM HG (ref 83–108)
POC PO2: 61.4 MM HG (ref 83–108)
POC PO2: 68 MM HG (ref 83–108)
POC PO2: 68 MM HG (ref 83–108)
POC PO2: 69.1 MM HG (ref 83–108)
POC PO2: 74.5 MM HG (ref 83–108)
POC PO2: 76.8 MM HG (ref 83–108)
POC PO2: 91 MM HG (ref 83–108)
POTASSIUM BLD-SCNC: 7.5 MMOL/L (ref 3.5–4.5)
POTASSIUM SERPL-SCNC: 4.2 MMOL/L (ref 3.7–5.3)
POTASSIUM SERPL-SCNC: 4.6 MMOL/L (ref 3.7–5.3)
POTASSIUM SERPL-SCNC: 5.3 MMOL/L (ref 3.7–5.3)
POTASSIUM SERPL-SCNC: 5.5 MMOL/L (ref 3.7–5.3)
POTASSIUM SERPL-SCNC: 5.6 MMOL/L (ref 3.7–5.3)
POTASSIUM SERPL-SCNC: 5.8 MMOL/L (ref 3.7–5.3)
POTASSIUM SERPL-SCNC: 6.8 MMOL/L (ref 3.7–5.3)
POTASSIUM SERPL-SCNC: 6.9 MMOL/L (ref 3.7–5.3)
PROT SERPL-MCNC: 5.5 G/DL (ref 6.4–8.3)
PROT SERPL-MCNC: 7.2 G/DL (ref 6.4–8.3)
PROT UR STRIP-MCNC: ABNORMAL MG/DL
PROTHROMBIN TIME: 18.4 SEC (ref 11.5–14.2)
PROTHROMBIN TIME: 18.8 SEC (ref 11.7–14.9)
RBC # BLD AUTO: 4.64 M/UL (ref 4.5–5.9)
RBC # BLD AUTO: 4.71 M/UL (ref 4.21–5.77)
RBC # BLD AUTO: 5.39 M/UL (ref 4.21–5.77)
RBC #/AREA URNS HPF: ABNORMAL /HPF (ref 0–2)
SALMONELLA DNA SPEC QL NAA+PROBE: NORMAL
SAMPLE SITE: ABNORMAL
SHIGA TOXIN STX GENE SPEC NAA+PROBE: NORMAL
SHIGELLA DNA SPEC QL NAA+PROBE: NORMAL
SODIUM BLD-SCNC: 133 MMOL/L (ref 138–146)
SODIUM SERPL-SCNC: 131 MMOL/L (ref 135–144)
SODIUM SERPL-SCNC: 131 MMOL/L (ref 135–144)
SODIUM SERPL-SCNC: 134 MMOL/L (ref 135–144)
SODIUM SERPL-SCNC: 135 MMOL/L (ref 135–144)
SODIUM SERPL-SCNC: 136 MMOL/L (ref 135–144)
SODIUM SERPL-SCNC: 137 MMOL/L (ref 135–144)
SODIUM UR-SCNC: 81 MMOL/L
SP GR UR STRIP: 1.02 (ref 1–1.03)
SPECIMEN DESCRIPTION: NORMAL
TOTAL PROTEIN, URINE: 197 MG/DL
TROPONIN I SERPL HS-MCNC: 133 NG/L (ref 0–22)
TROPONIN I SERPL HS-MCNC: 317 NG/L (ref 0–22)
TROPONIN I SERPL HS-MCNC: 318 NG/L (ref 0–22)
UROBILINOGEN UR STRIP-ACNC: NORMAL EU/DL (ref 0–1)
V CHOL+PARA RFBL+TRKH+TNAA STL QL NAA+PR: NORMAL
WBC #/AREA URNS HPF: ABNORMAL /HPF (ref 0–5)
WBC OTHER # BLD: 25.3 K/UL (ref 3.5–11.3)
WBC OTHER # BLD: 31.9 K/UL (ref 3.5–11)
WBC OTHER # BLD: 43.7 K/UL (ref 3.5–11.3)
Y ENTERO RECN STL QL NAA+PROBE: NORMAL

## 2024-01-01 PROCEDURE — 82803 BLOOD GASES ANY COMBINATION: CPT

## 2024-01-01 PROCEDURE — 36600 WITHDRAWAL OF ARTERIAL BLOOD: CPT

## 2024-01-01 PROCEDURE — 82330 ASSAY OF CALCIUM: CPT

## 2024-01-01 PROCEDURE — 80074 ACUTE HEPATITIS PANEL: CPT

## 2024-01-01 PROCEDURE — 94002 VENT MGMT INPAT INIT DAY: CPT

## 2024-01-01 PROCEDURE — 2500000003 HC RX 250 WO HCPCS

## 2024-01-01 PROCEDURE — 6360000002 HC RX W HCPCS

## 2024-01-01 PROCEDURE — 71045 X-RAY EXAM CHEST 1 VIEW: CPT

## 2024-01-01 PROCEDURE — 80051 ELECTROLYTE PANEL: CPT

## 2024-01-01 PROCEDURE — 2580000003 HC RX 258

## 2024-01-01 PROCEDURE — 99233 SBSQ HOSP IP/OBS HIGH 50: CPT | Performed by: INTERNAL MEDICINE

## 2024-01-01 PROCEDURE — 31500 INSERT EMERGENCY AIRWAY: CPT

## 2024-01-01 PROCEDURE — 81001 URINALYSIS AUTO W/SCOPE: CPT

## 2024-01-01 PROCEDURE — 99291 CRITICAL CARE FIRST HOUR: CPT | Performed by: INTERNAL MEDICINE

## 2024-01-01 PROCEDURE — 93005 ELECTROCARDIOGRAM TRACING: CPT

## 2024-01-01 PROCEDURE — 83735 ASSAY OF MAGNESIUM: CPT

## 2024-01-01 PROCEDURE — 84100 ASSAY OF PHOSPHORUS: CPT

## 2024-01-01 PROCEDURE — 2580000003 HC RX 258: Performed by: INTERNAL MEDICINE

## 2024-01-01 PROCEDURE — 6370000000 HC RX 637 (ALT 250 FOR IP)

## 2024-01-01 PROCEDURE — 70450 CT HEAD/BRAIN W/O DYE: CPT

## 2024-01-01 PROCEDURE — A4216 STERILE WATER/SALINE, 10 ML: HCPCS

## 2024-01-01 PROCEDURE — 86316 IMMUNOASSAY TUMOR OTHER: CPT

## 2024-01-01 PROCEDURE — 84520 ASSAY OF UREA NITROGEN: CPT

## 2024-01-01 PROCEDURE — 84300 ASSAY OF URINE SODIUM: CPT

## 2024-01-01 PROCEDURE — 82947 ASSAY GLUCOSE BLOOD QUANT: CPT

## 2024-01-01 PROCEDURE — C9113 INJ PANTOPRAZOLE SODIUM, VIA: HCPCS

## 2024-01-01 PROCEDURE — 82271 OCCULT BLOOD OTHER SOURCES: CPT

## 2024-01-01 PROCEDURE — 99285 EMERGENCY DEPT VISIT HI MDM: CPT

## 2024-01-01 PROCEDURE — 99291 CRITICAL CARE FIRST HOUR: CPT | Performed by: STUDENT IN AN ORGANIZED HEALTH CARE EDUCATION/TRAINING PROGRAM

## 2024-01-01 PROCEDURE — 80053 COMPREHEN METABOLIC PANEL: CPT

## 2024-01-01 PROCEDURE — 86038 ANTINUCLEAR ANTIBODIES: CPT

## 2024-01-01 PROCEDURE — 2500000003 HC RX 250 WO HCPCS: Performed by: FAMILY MEDICINE

## 2024-01-01 PROCEDURE — 87040 BLOOD CULTURE FOR BACTERIA: CPT

## 2024-01-01 PROCEDURE — 85014 HEMATOCRIT: CPT

## 2024-01-01 PROCEDURE — 85730 THROMBOPLASTIN TIME PARTIAL: CPT

## 2024-01-01 PROCEDURE — 6370000000 HC RX 637 (ALT 250 FOR IP): Performed by: INTERNAL MEDICINE

## 2024-01-01 PROCEDURE — 2700000000 HC OXYGEN THERAPY PER DAY

## 2024-01-01 PROCEDURE — 6360000004 HC RX CONTRAST MEDICATION

## 2024-01-01 PROCEDURE — 85025 COMPLETE CBC W/AUTO DIFF WBC: CPT

## 2024-01-01 PROCEDURE — 93010 ELECTROCARDIOGRAM REPORT: CPT | Performed by: INTERNAL MEDICINE

## 2024-01-01 PROCEDURE — 92950 HEART/LUNG RESUSCITATION CPR: CPT

## 2024-01-01 PROCEDURE — 71260 CT THORAX DX C+: CPT

## 2024-01-01 PROCEDURE — 6360000002 HC RX W HCPCS: Performed by: INTERNAL MEDICINE

## 2024-01-01 PROCEDURE — 86160 COMPLEMENT ANTIGEN: CPT

## 2024-01-01 PROCEDURE — 96374 THER/PROPH/DIAG INJ IV PUSH: CPT

## 2024-01-01 PROCEDURE — 80048 BASIC METABOLIC PNL TOTAL CA: CPT

## 2024-01-01 PROCEDURE — 89220 SPUTUM SPECIMEN COLLECTION: CPT

## 2024-01-01 PROCEDURE — 37799 UNLISTED PX VASCULAR SURGERY: CPT

## 2024-01-01 PROCEDURE — 94761 N-INVAS EAR/PLS OXIMETRY MLT: CPT

## 2024-01-01 PROCEDURE — 2500000003 HC RX 250 WO HCPCS: Performed by: INTERNAL MEDICINE

## 2024-01-01 PROCEDURE — 87070 CULTURE OTHR SPECIMN AEROBIC: CPT

## 2024-01-01 PROCEDURE — 87449 NOS EACH ORGANISM AG IA: CPT

## 2024-01-01 PROCEDURE — 36415 COLL VENOUS BLD VENIPUNCTURE: CPT

## 2024-01-01 PROCEDURE — 85520 HEPARIN ASSAY: CPT

## 2024-01-01 PROCEDURE — 2000000000 HC ICU R&B

## 2024-01-01 PROCEDURE — 96375 TX/PRO/DX INJ NEW DRUG ADDON: CPT

## 2024-01-01 PROCEDURE — 87324 CLOSTRIDIUM AG IA: CPT

## 2024-01-01 PROCEDURE — 83521 IG LIGHT CHAINS FREE EACH: CPT

## 2024-01-01 PROCEDURE — 83516 IMMUNOASSAY NONANTIBODY: CPT

## 2024-01-01 PROCEDURE — 87641 MR-STAPH DNA AMP PROBE: CPT

## 2024-01-01 PROCEDURE — 86225 DNA ANTIBODY NATIVE: CPT

## 2024-01-01 PROCEDURE — 85610 PROTHROMBIN TIME: CPT

## 2024-01-01 PROCEDURE — 02HV33Z INSERTION OF INFUSION DEVICE INTO SUPERIOR VENA CAVA, PERCUTANEOUS APPROACH: ICD-10-PCS | Performed by: INTERNAL MEDICINE

## 2024-01-01 PROCEDURE — 94003 VENT MGMT INPAT SUBQ DAY: CPT

## 2024-01-01 PROCEDURE — 83605 ASSAY OF LACTIC ACID: CPT

## 2024-01-01 PROCEDURE — 87506 IADNA-DNA/RNA PROBE TQ 6-11: CPT

## 2024-01-01 PROCEDURE — 93306 TTE W/DOPPLER COMPLETE: CPT

## 2024-01-01 PROCEDURE — 93005 ELECTROCARDIOGRAM TRACING: CPT | Performed by: FAMILY MEDICINE

## 2024-01-01 PROCEDURE — 87205 SMEAR GRAM STAIN: CPT

## 2024-01-01 PROCEDURE — 84156 ASSAY OF PROTEIN URINE: CPT

## 2024-01-01 PROCEDURE — 84484 ASSAY OF TROPONIN QUANT: CPT

## 2024-01-01 PROCEDURE — 36556 INSERT NON-TUNNEL CV CATH: CPT

## 2024-01-01 PROCEDURE — 82565 ASSAY OF CREATININE: CPT

## 2024-01-01 PROCEDURE — 99223 1ST HOSP IP/OBS HIGH 75: CPT | Performed by: INTERNAL MEDICINE

## 2024-01-01 PROCEDURE — 86334 IMMUNOFIX E-PHORESIS SERUM: CPT

## 2024-01-01 PROCEDURE — 6360000002 HC RX W HCPCS: Performed by: STUDENT IN AN ORGANIZED HEALTH CARE EDUCATION/TRAINING PROGRAM

## 2024-01-01 PROCEDURE — 5A1D90Z PERFORMANCE OF URINARY FILTRATION, CONTINUOUS, GREATER THAN 18 HOURS PER DAY: ICD-10-PCS | Performed by: INTERNAL MEDICINE

## 2024-01-01 PROCEDURE — 99222 1ST HOSP IP/OBS MODERATE 55: CPT | Performed by: INTERNAL MEDICINE

## 2024-01-01 PROCEDURE — 36556 INSERT NON-TUNNEL CV CATH: CPT | Performed by: INTERNAL MEDICINE

## 2024-01-01 PROCEDURE — 90945 DIALYSIS ONE EVALUATION: CPT

## 2024-01-01 PROCEDURE — 99232 SBSQ HOSP IP/OBS MODERATE 35: CPT | Performed by: INTERNAL MEDICINE

## 2024-01-01 PROCEDURE — 5A1945Z RESPIRATORY VENTILATION, 24-96 CONSECUTIVE HOURS: ICD-10-PCS | Performed by: EMERGENCY MEDICINE

## 2024-01-01 PROCEDURE — 6360000002 HC RX W HCPCS: Performed by: FAMILY MEDICINE

## 2024-01-01 PROCEDURE — 94640 AIRWAY INHALATION TREATMENT: CPT

## 2024-01-01 PROCEDURE — 2580000003 HC RX 258: Performed by: FAMILY MEDICINE

## 2024-01-01 PROCEDURE — 93306 TTE W/DOPPLER COMPLETE: CPT | Performed by: INTERNAL MEDICINE

## 2024-01-01 PROCEDURE — 82570 ASSAY OF URINE CREATININE: CPT

## 2024-01-01 PROCEDURE — 06HY33Z INSERTION OF INFUSION DEVICE INTO LOWER VEIN, PERCUTANEOUS APPROACH: ICD-10-PCS | Performed by: INTERNAL MEDICINE

## 2024-01-01 PROCEDURE — 76775 US EXAM ABDO BACK WALL LIM: CPT

## 2024-01-01 PROCEDURE — 80076 HEPATIC FUNCTION PANEL: CPT

## 2024-01-01 RX ORDER — EPINEPHRINE IN SOD CHLOR,ISO 1 MG/10 ML
SYRINGE (ML) INTRAVENOUS DAILY PRN
Status: COMPLETED | OUTPATIENT
Start: 2024-01-01 | End: 2024-01-01

## 2024-01-01 RX ORDER — LOPERAMIDE HYDROCHLORIDE 2 MG/1
2 CAPSULE ORAL PRN
Status: DISCONTINUED | OUTPATIENT
Start: 2024-01-01 | End: 2024-01-01 | Stop reason: HOSPADM

## 2024-01-01 RX ORDER — LOPERAMIDE HYDROCHLORIDE 2 MG/1
2 CAPSULE ORAL PRN
Status: DISCONTINUED | OUTPATIENT
Start: 2024-01-01 | End: 2024-01-01

## 2024-01-01 RX ORDER — DEXTROSE MONOHYDRATE 25 G/50ML
25 INJECTION, SOLUTION INTRAVENOUS ONCE
Status: COMPLETED | OUTPATIENT
Start: 2024-01-01 | End: 2024-01-01

## 2024-01-01 RX ORDER — CALCIUM GLUCONATE 20 MG/ML
2000 INJECTION, SOLUTION INTRAVENOUS ONCE
Status: COMPLETED | OUTPATIENT
Start: 2024-01-01 | End: 2024-01-01

## 2024-01-01 RX ORDER — DOCUSATE SODIUM 100 MG/1
100 CAPSULE, LIQUID FILLED ORAL 2 TIMES DAILY
COMMUNITY

## 2024-01-01 RX ORDER — LORAZEPAM 1 MG/1
1 TABLET ORAL EVERY 4 HOURS PRN
Status: DISCONTINUED | OUTPATIENT
Start: 2024-01-01 | End: 2024-01-01 | Stop reason: HOSPADM

## 2024-01-01 RX ORDER — 0.9 % SODIUM CHLORIDE 0.9 %
2000 INTRAVENOUS SOLUTION INTRAVENOUS ONCE
Status: COMPLETED | OUTPATIENT
Start: 2024-01-01 | End: 2024-01-01

## 2024-01-01 RX ORDER — INSULIN LISPRO 100 [IU]/ML
0-4 INJECTION, SOLUTION INTRAVENOUS; SUBCUTANEOUS EVERY 6 HOURS
Status: DISCONTINUED | OUTPATIENT
Start: 2024-01-01 | End: 2024-01-01 | Stop reason: HOSPADM

## 2024-01-01 RX ORDER — SODIUM CHLORIDE 9 MG/ML
INJECTION, SOLUTION INTRAVENOUS PRN
Status: DISCONTINUED | OUTPATIENT
Start: 2024-01-01 | End: 2024-01-01 | Stop reason: HOSPADM

## 2024-01-01 RX ORDER — CALCIUM GLUCONATE 20 MG/ML
1000 INJECTION, SOLUTION INTRAVENOUS PRN
Status: DISCONTINUED | OUTPATIENT
Start: 2024-01-01 | End: 2024-01-01 | Stop reason: HOSPADM

## 2024-01-01 RX ORDER — HEPARIN SODIUM 1000 [USP'U]/ML
4000 INJECTION, SOLUTION INTRAVENOUS; SUBCUTANEOUS ONCE
Status: COMPLETED | OUTPATIENT
Start: 2024-01-01 | End: 2024-01-01

## 2024-01-01 RX ORDER — POTASSIUM CHLORIDE 29.8 MG/ML
20 INJECTION INTRAVENOUS PRN
Status: DISCONTINUED | OUTPATIENT
Start: 2024-01-01 | End: 2024-01-01

## 2024-01-01 RX ORDER — CALCIUM GLUCONATE 20 MG/ML
2000 INJECTION, SOLUTION INTRAVENOUS PRN
Status: DISCONTINUED | OUTPATIENT
Start: 2024-01-01 | End: 2024-01-01 | Stop reason: HOSPADM

## 2024-01-01 RX ORDER — MAGNESIUM SULFATE IN WATER 40 MG/ML
2000 INJECTION, SOLUTION INTRAVENOUS PRN
Status: DISCONTINUED | OUTPATIENT
Start: 2024-01-01 | End: 2024-01-01

## 2024-01-01 RX ORDER — MAGNESIUM SULFATE 1 G/100ML
1000 INJECTION INTRAVENOUS PRN
Status: DISCONTINUED | OUTPATIENT
Start: 2024-01-01 | End: 2024-01-01 | Stop reason: HOSPADM

## 2024-01-01 RX ORDER — HEPARIN SODIUM 5000 [USP'U]/ML
1700 INJECTION, SOLUTION INTRAVENOUS; SUBCUTANEOUS PRN
Status: DISCONTINUED | OUTPATIENT
Start: 2024-01-01 | End: 2024-01-01

## 2024-01-01 RX ORDER — ONDANSETRON 4 MG/1
4 TABLET, ORALLY DISINTEGRATING ORAL EVERY 8 HOURS PRN
Status: DISCONTINUED | OUTPATIENT
Start: 2024-01-01 | End: 2024-01-01 | Stop reason: HOSPADM

## 2024-01-01 RX ORDER — LORAZEPAM 1 MG/1
1 TABLET ORAL
Status: DISCONTINUED | OUTPATIENT
Start: 2024-01-01 | End: 2024-01-01

## 2024-01-01 RX ORDER — CYCLOBENZAPRINE HCL 10 MG
10 TABLET ORAL 2 TIMES DAILY PRN
COMMUNITY

## 2024-01-01 RX ORDER — MIDODRINE HYDROCHLORIDE 5 MG/1
10 TABLET ORAL 3 TIMES DAILY
Status: DISCONTINUED | OUTPATIENT
Start: 2024-01-01 | End: 2024-01-01 | Stop reason: HOSPADM

## 2024-01-01 RX ORDER — NOREPINEPHRINE BITARTRATE 0.06 MG/ML
1-100 INJECTION, SOLUTION INTRAVENOUS CONTINUOUS
Status: DISCONTINUED | OUTPATIENT
Start: 2024-01-01 | End: 2024-01-01 | Stop reason: HOSPADM

## 2024-01-01 RX ORDER — INSULIN ASPART 100 [IU]/ML
0-18 INJECTION, SOLUTION INTRAVENOUS; SUBCUTANEOUS 3 TIMES DAILY
COMMUNITY

## 2024-01-01 RX ORDER — LEVETIRACETAM 500 MG/5ML
500 INJECTION, SOLUTION, CONCENTRATE INTRAVENOUS EVERY 12 HOURS
Status: DISCONTINUED | OUTPATIENT
Start: 2024-01-01 | End: 2024-01-01 | Stop reason: HOSPADM

## 2024-01-01 RX ORDER — INSULIN LISPRO 100 [IU]/ML
0-4 INJECTION, SOLUTION INTRAVENOUS; SUBCUTANEOUS NIGHTLY
Status: DISCONTINUED | OUTPATIENT
Start: 2024-01-01 | End: 2024-01-01

## 2024-01-01 RX ORDER — HEPARIN SODIUM 1000 [USP'U]/ML
1000 INJECTION, SOLUTION INTRAVENOUS; SUBCUTANEOUS PRN
Status: DISCONTINUED | OUTPATIENT
Start: 2024-01-01 | End: 2024-01-01 | Stop reason: HOSPADM

## 2024-01-01 RX ORDER — SODIUM CHLORIDE 9 MG/ML
INJECTION, SOLUTION INTRAVENOUS CONTINUOUS
Status: DISCONTINUED | OUTPATIENT
Start: 2024-01-01 | End: 2024-01-01

## 2024-01-01 RX ORDER — INSULIN LISPRO 100 [IU]/ML
0-8 INJECTION, SOLUTION INTRAVENOUS; SUBCUTANEOUS
Status: DISCONTINUED | OUTPATIENT
Start: 2024-01-01 | End: 2024-01-01

## 2024-01-01 RX ORDER — IPRATROPIUM BROMIDE AND ALBUTEROL SULFATE 2.5; .5 MG/3ML; MG/3ML
1 SOLUTION RESPIRATORY (INHALATION)
Status: DISCONTINUED | OUTPATIENT
Start: 2024-01-01 | End: 2024-01-01 | Stop reason: HOSPADM

## 2024-01-01 RX ORDER — MIDODRINE HYDROCHLORIDE 5 MG/1
5 TABLET ORAL 3 TIMES DAILY
Status: DISCONTINUED | OUTPATIENT
Start: 2024-01-01 | End: 2024-01-01

## 2024-01-01 RX ORDER — POTASSIUM CHLORIDE 7.45 MG/ML
10 INJECTION INTRAVENOUS PRN
Status: DISCONTINUED | OUTPATIENT
Start: 2024-01-01 | End: 2024-01-01

## 2024-01-01 RX ORDER — HEPARIN SODIUM 5000 [USP'U]/ML
1500 INJECTION, SOLUTION INTRAVENOUS; SUBCUTANEOUS PRN
Status: DISCONTINUED | OUTPATIENT
Start: 2024-01-01 | End: 2024-01-01

## 2024-01-01 RX ORDER — MIDAZOLAM HYDROCHLORIDE 2 MG/2ML
2 INJECTION, SOLUTION INTRAMUSCULAR; INTRAVENOUS ONCE
Status: COMPLETED | OUTPATIENT
Start: 2024-01-01 | End: 2024-01-01

## 2024-01-01 RX ORDER — ONDANSETRON 2 MG/ML
4 INJECTION INTRAMUSCULAR; INTRAVENOUS EVERY 6 HOURS PRN
Status: DISCONTINUED | OUTPATIENT
Start: 2024-01-01 | End: 2024-01-01

## 2024-01-01 RX ORDER — POTASSIUM CHLORIDE 29.8 MG/ML
20 INJECTION INTRAVENOUS PRN
Status: DISCONTINUED | OUTPATIENT
Start: 2024-01-01 | End: 2024-01-01 | Stop reason: HOSPADM

## 2024-01-01 RX ORDER — ONDANSETRON 2 MG/ML
4 INJECTION INTRAMUSCULAR; INTRAVENOUS EVERY 6 HOURS PRN
Status: DISCONTINUED | OUTPATIENT
Start: 2024-01-01 | End: 2024-01-01 | Stop reason: HOSPADM

## 2024-01-01 RX ORDER — FLUTICASONE PROPIONATE 50 MCG
1 SPRAY, SUSPENSION (ML) NASAL DAILY
COMMUNITY

## 2024-01-01 RX ORDER — ENOXAPARIN SODIUM 100 MG/ML
40 INJECTION SUBCUTANEOUS 2 TIMES DAILY
Status: DISCONTINUED | OUTPATIENT
Start: 2024-01-01 | End: 2024-01-01

## 2024-01-01 RX ORDER — MORPHINE SULFATE 2 MG/ML
2 INJECTION, SOLUTION INTRAMUSCULAR; INTRAVENOUS
Status: DISCONTINUED | OUTPATIENT
Start: 2024-01-01 | End: 2024-01-01

## 2024-01-01 RX ORDER — 0.9 % SODIUM CHLORIDE 0.9 %
30 INTRAVENOUS SOLUTION INTRAVENOUS ONCE
Status: DISCONTINUED | OUTPATIENT
Start: 2024-01-01 | End: 2024-01-01

## 2024-01-01 RX ORDER — GLUCAGON 1 MG/ML
1 KIT INJECTION PRN
Status: DISCONTINUED | OUTPATIENT
Start: 2024-01-01 | End: 2024-01-01 | Stop reason: HOSPADM

## 2024-01-01 RX ORDER — METOPROLOL TARTRATE 1 MG/ML
5 INJECTION, SOLUTION INTRAVENOUS EVERY 6 HOURS
Status: DISCONTINUED | OUTPATIENT
Start: 2024-01-01 | End: 2024-01-01 | Stop reason: HOSPADM

## 2024-01-01 RX ORDER — ACETAMINOPHEN 325 MG/1
650 TABLET ORAL EVERY 6 HOURS PRN
Status: DISCONTINUED | OUTPATIENT
Start: 2024-01-01 | End: 2024-01-01 | Stop reason: HOSPADM

## 2024-01-01 RX ORDER — ACETAMINOPHEN 650 MG/1
650 SUPPOSITORY RECTAL ONCE
Status: DISCONTINUED | OUTPATIENT
Start: 2024-01-01 | End: 2024-01-01

## 2024-01-01 RX ORDER — ACETAMINOPHEN 325 MG/1
650 TABLET ORAL EVERY 6 HOURS PRN
Status: DISCONTINUED | OUTPATIENT
Start: 2024-01-01 | End: 2024-01-01

## 2024-01-01 RX ORDER — DEXTROSE MONOHYDRATE 100 MG/ML
INJECTION, SOLUTION INTRAVENOUS CONTINUOUS PRN
Status: DISCONTINUED | OUTPATIENT
Start: 2024-01-01 | End: 2024-01-01 | Stop reason: HOSPADM

## 2024-01-01 RX ORDER — POLYETHYLENE GLYCOL 3350 17 G/17G
17 POWDER, FOR SOLUTION ORAL DAILY PRN
Status: DISCONTINUED | OUTPATIENT
Start: 2024-01-01 | End: 2024-01-01

## 2024-01-01 RX ORDER — ACETAMINOPHEN 650 MG/1
650 SUPPOSITORY RECTAL EVERY 6 HOURS PRN
Status: DISCONTINUED | OUTPATIENT
Start: 2024-01-01 | End: 2024-01-01 | Stop reason: HOSPADM

## 2024-01-01 RX ORDER — HEPARIN SODIUM 10000 [USP'U]/100ML
5-30 INJECTION, SOLUTION INTRAVENOUS CONTINUOUS
Status: DISCONTINUED | OUTPATIENT
Start: 2024-01-01 | End: 2024-01-01 | Stop reason: HOSPADM

## 2024-01-01 RX ORDER — TAMSULOSIN HYDROCHLORIDE 0.4 MG/1
0.8 CAPSULE ORAL DAILY
COMMUNITY

## 2024-01-01 RX ORDER — GLYCOPYRROLATE 0.2 MG/ML
0.2 INJECTION INTRAMUSCULAR; INTRAVENOUS EVERY 4 HOURS PRN
Status: DISCONTINUED | OUTPATIENT
Start: 2024-01-01 | End: 2024-01-01

## 2024-01-01 RX ORDER — SODIUM CHLORIDE 0.9 % (FLUSH) 0.9 %
5-40 SYRINGE (ML) INJECTION PRN
Status: DISCONTINUED | OUTPATIENT
Start: 2024-01-01 | End: 2024-01-01 | Stop reason: HOSPADM

## 2024-01-01 RX ORDER — INSULIN LISPRO 100 [IU]/ML
0-4 INJECTION, SOLUTION INTRAVENOUS; SUBCUTANEOUS
Status: DISCONTINUED | OUTPATIENT
Start: 2024-01-01 | End: 2024-01-01

## 2024-01-01 RX ORDER — METOPROLOL TARTRATE 1 MG/ML
INJECTION, SOLUTION INTRAVENOUS
Status: COMPLETED
Start: 2024-01-01 | End: 2024-01-01

## 2024-01-01 RX ORDER — HEPARIN SODIUM 1000 [USP'U]/ML
4000 INJECTION, SOLUTION INTRAVENOUS; SUBCUTANEOUS PRN
Status: DISCONTINUED | OUTPATIENT
Start: 2024-01-01 | End: 2024-01-01 | Stop reason: HOSPADM

## 2024-01-01 RX ORDER — MAGNESIUM SULFATE IN WATER 40 MG/ML
2000 INJECTION, SOLUTION INTRAVENOUS ONCE
Status: COMPLETED | OUTPATIENT
Start: 2024-01-01 | End: 2024-01-01

## 2024-01-01 RX ORDER — GLYCOPYRROLATE 0.2 MG/ML
0.1 INJECTION INTRAMUSCULAR; INTRAVENOUS EVERY 8 HOURS PRN
Status: DISCONTINUED | OUTPATIENT
Start: 2024-01-01 | End: 2024-01-01 | Stop reason: HOSPADM

## 2024-01-01 RX ORDER — ACETAMINOPHEN 650 MG/1
650 SUPPOSITORY RECTAL EVERY 6 HOURS PRN
Status: DISCONTINUED | OUTPATIENT
Start: 2024-01-01 | End: 2024-01-01

## 2024-01-01 RX ORDER — POLYETHYLENE GLYCOL 3350 17 G/17G
17 POWDER, FOR SOLUTION ORAL DAILY PRN
Status: DISCONTINUED | OUTPATIENT
Start: 2024-01-01 | End: 2024-01-01 | Stop reason: HOSPADM

## 2024-01-01 RX ORDER — HEPARIN SODIUM 1000 [USP'U]/ML
1200 INJECTION, SOLUTION INTRAVENOUS; SUBCUTANEOUS PRN
Status: DISCONTINUED | OUTPATIENT
Start: 2024-01-01 | End: 2024-01-01 | Stop reason: HOSPADM

## 2024-01-01 RX ORDER — ONDANSETRON 4 MG/1
4 TABLET, ORALLY DISINTEGRATING ORAL EVERY 8 HOURS PRN
Status: DISCONTINUED | OUTPATIENT
Start: 2024-01-01 | End: 2024-01-01

## 2024-01-01 RX ORDER — SODIUM CHLORIDE 0.9 % (FLUSH) 0.9 %
5-40 SYRINGE (ML) INJECTION EVERY 12 HOURS SCHEDULED
Status: DISCONTINUED | OUTPATIENT
Start: 2024-01-01 | End: 2024-01-01 | Stop reason: HOSPADM

## 2024-01-01 RX ORDER — MONTELUKAST SODIUM 10 MG/1
10 TABLET ORAL NIGHTLY
COMMUNITY

## 2024-01-01 RX ORDER — HEPARIN SODIUM 1000 [USP'U]/ML
2000 INJECTION, SOLUTION INTRAVENOUS; SUBCUTANEOUS PRN
Status: DISCONTINUED | OUTPATIENT
Start: 2024-01-01 | End: 2024-01-01 | Stop reason: HOSPADM

## 2024-01-01 RX ORDER — MORPHINE SULFATE 4 MG/ML
4 INJECTION, SOLUTION INTRAMUSCULAR; INTRAVENOUS
Status: DISCONTINUED | OUTPATIENT
Start: 2024-01-01 | End: 2024-01-01

## 2024-01-01 RX ADMIN — Medication 1500 ML/HR: at 10:32

## 2024-01-01 RX ADMIN — INSULIN LISPRO 1 UNITS: 100 INJECTION, SOLUTION INTRAVENOUS; SUBCUTANEOUS at 22:10

## 2024-01-01 RX ADMIN — Medication 60 MCG/MIN: at 21:23

## 2024-01-01 RX ADMIN — SODIUM CHLORIDE: 9 INJECTION, SOLUTION INTRAVENOUS at 11:17

## 2024-01-01 RX ADMIN — Medication 35 MCG/MIN: at 10:39

## 2024-01-01 RX ADMIN — INSULIN LISPRO 1 UNITS: 100 INJECTION, SOLUTION INTRAVENOUS; SUBCUTANEOUS at 13:56

## 2024-01-01 RX ADMIN — IPRATROPIUM BROMIDE AND ALBUTEROL SULFATE 1 DOSE: 2.5; .5 SOLUTION RESPIRATORY (INHALATION) at 06:14

## 2024-01-01 RX ADMIN — LEVETIRACETAM 3000 MG: 100 INJECTION, SOLUTION INTRAVENOUS at 17:43

## 2024-01-01 RX ADMIN — SODIUM CHLORIDE 2000 ML: 9 INJECTION, SOLUTION INTRAVENOUS at 04:46

## 2024-01-01 RX ADMIN — IPRATROPIUM BROMIDE AND ALBUTEROL SULFATE 1 DOSE: 2.5; .5 SOLUTION RESPIRATORY (INHALATION) at 15:47

## 2024-01-01 RX ADMIN — MAGNESIUM SULFATE HEPTAHYDRATE 2000 MG: 40 INJECTION, SOLUTION INTRAVENOUS at 09:19

## 2024-01-01 RX ADMIN — VASOPRESSIN 0.03 UNITS/MIN: 0.2 INJECTION INTRAVENOUS at 05:35

## 2024-01-01 RX ADMIN — Medication 1 MG: at 20:41

## 2024-01-01 RX ADMIN — HEPARIN SODIUM 1000 UNITS: 1000 INJECTION INTRAVENOUS; SUBCUTANEOUS at 06:16

## 2024-01-01 RX ADMIN — CALCIUM GLUCONATE 2000 MG: 20 INJECTION, SOLUTION INTRAVENOUS at 00:26

## 2024-01-01 RX ADMIN — Medication 1500 ML/HR: at 12:42

## 2024-01-01 RX ADMIN — LEVETIRACETAM 500 MG: 100 INJECTION, SOLUTION INTRAVENOUS at 09:52

## 2024-01-01 RX ADMIN — Medication 1500 ML/HR: at 10:30

## 2024-01-01 RX ADMIN — IPRATROPIUM BROMIDE AND ALBUTEROL SULFATE 1 DOSE: 2.5; .5 SOLUTION RESPIRATORY (INHALATION) at 20:07

## 2024-01-01 RX ADMIN — SODIUM CHLORIDE: 9 INJECTION, SOLUTION INTRAVENOUS at 03:00

## 2024-01-01 RX ADMIN — Medication 5 MCG/MIN: at 21:57

## 2024-01-01 RX ADMIN — Medication 1 MCG/MIN: at 21:00

## 2024-01-01 RX ADMIN — INSULIN HUMAN 10 UNITS: 100 INJECTION, SOLUTION PARENTERAL at 05:20

## 2024-01-01 RX ADMIN — VASOPRESSIN 0.03 UNITS/MIN: 0.2 INJECTION INTRAVENOUS at 00:48

## 2024-01-01 RX ADMIN — IPRATROPIUM BROMIDE AND ALBUTEROL SULFATE 1 DOSE: 2.5; .5 SOLUTION RESPIRATORY (INHALATION) at 07:51

## 2024-01-01 RX ADMIN — SODIUM CHLORIDE: 9 INJECTION, SOLUTION INTRAVENOUS at 05:13

## 2024-01-01 RX ADMIN — PANTOPRAZOLE SODIUM 40 MG: 40 INJECTION, POWDER, FOR SOLUTION INTRAVENOUS at 13:25

## 2024-01-01 RX ADMIN — PANTOPRAZOLE SODIUM 40 MG: 40 INJECTION, POWDER, FOR SOLUTION INTRAVENOUS at 09:52

## 2024-01-01 RX ADMIN — SODIUM CHLORIDE, PRESERVATIVE FREE 10 ML: 5 INJECTION INTRAVENOUS at 09:00

## 2024-01-01 RX ADMIN — SODIUM ZIRCONIUM CYCLOSILICATE 10 G: 10 POWDER, FOR SUSPENSION ORAL at 04:47

## 2024-01-01 RX ADMIN — SODIUM CHLORIDE, PRESERVATIVE FREE 10 ML: 5 INJECTION INTRAVENOUS at 20:48

## 2024-01-01 RX ADMIN — Medication 1 MG: at 20:56

## 2024-01-01 RX ADMIN — Medication 60 MCG/MIN: at 06:07

## 2024-01-01 RX ADMIN — METOPROLOL TARTRATE 5 MG: 1 INJECTION, SOLUTION INTRAVENOUS at 12:31

## 2024-01-01 RX ADMIN — Medication 1500 ML/HR: at 03:17

## 2024-01-01 RX ADMIN — CALCIUM GLUCONATE 1000 MG: 20 INJECTION, SOLUTION INTRAVENOUS at 22:30

## 2024-01-01 RX ADMIN — MIDODRINE HYDROCHLORIDE 5 MG: 5 TABLET ORAL at 13:31

## 2024-01-01 RX ADMIN — Medication 1500 ML/HR: at 03:19

## 2024-01-01 RX ADMIN — Medication 2 MCG/MIN: at 21:07

## 2024-01-01 RX ADMIN — VASOPRESSIN 0.03 UNITS/MIN: 0.2 INJECTION INTRAVENOUS at 12:37

## 2024-01-01 RX ADMIN — Medication 1 MCG/MIN: at 20:54

## 2024-01-01 RX ADMIN — PANTOPRAZOLE SODIUM 40 MG: 40 INJECTION, POWDER, FOR SOLUTION INTRAVENOUS at 00:20

## 2024-01-01 RX ADMIN — EPINEPHRINE 10 MCG/MIN: 1 INJECTION INTRAMUSCULAR; INTRAVENOUS; SUBCUTANEOUS at 15:06

## 2024-01-01 RX ADMIN — INSULIN HUMAN 10 UNITS: 100 INJECTION, SOLUTION PARENTERAL at 00:00

## 2024-01-01 RX ADMIN — INSULIN LISPRO 1 UNITS: 100 INJECTION, SOLUTION INTRAVENOUS; SUBCUTANEOUS at 04:30

## 2024-01-01 RX ADMIN — DEXTROSE MONOHYDRATE 25 G: 25 INJECTION, SOLUTION INTRAVENOUS at 23:54

## 2024-01-01 RX ADMIN — CALCIUM GLUCONATE 2000 MG: 20 INJECTION, SOLUTION INTRAVENOUS at 05:13

## 2024-01-01 RX ADMIN — Medication 1500 ML/HR: at 12:41

## 2024-01-01 RX ADMIN — Medication 60 MCG/MIN: at 10:39

## 2024-01-01 RX ADMIN — EPINEPHRINE 8 MCG/MIN: 1 INJECTION INTRAMUSCULAR; INTRAVENOUS; SUBCUTANEOUS at 05:36

## 2024-01-01 RX ADMIN — VASOPRESSIN 0.03 UNITS/MIN: 0.2 INJECTION INTRAVENOUS at 13:54

## 2024-01-01 RX ADMIN — HEPARIN SODIUM 1200 UNITS: 1000 INJECTION INTRAVENOUS; SUBCUTANEOUS at 22:28

## 2024-01-01 RX ADMIN — Medication 1 MG: at 20:29

## 2024-01-01 RX ADMIN — DEXTROSE MONOHYDRATE 25 G: 25 INJECTION, SOLUTION INTRAVENOUS at 00:25

## 2024-01-01 RX ADMIN — SODIUM CHLORIDE: 9 INJECTION, SOLUTION INTRAVENOUS at 17:53

## 2024-01-01 RX ADMIN — ACETAMINOPHEN 650 MG: 325 TABLET ORAL at 04:47

## 2024-01-01 RX ADMIN — Medication 6 MCG/MIN: at 21:32

## 2024-01-01 RX ADMIN — Medication 1500 ML/HR: at 03:18

## 2024-01-01 RX ADMIN — SODIUM CHLORIDE: 9 INJECTION, SOLUTION INTRAVENOUS at 09:17

## 2024-01-01 RX ADMIN — IOPAMIDOL 75 ML: 755 INJECTION, SOLUTION INTRAVENOUS at 03:15

## 2024-01-01 RX ADMIN — CEFEPIME 1000 MG: 1 INJECTION, POWDER, FOR SOLUTION INTRAMUSCULAR; INTRAVENOUS at 05:50

## 2024-01-01 RX ADMIN — MIDAZOLAM HYDROCHLORIDE 2 MG: 1 INJECTION, SOLUTION INTRAMUSCULAR; INTRAVENOUS at 13:28

## 2024-01-01 RX ADMIN — CALCIUM GLUCONATE 1000 MG: 20 INJECTION, SOLUTION INTRAVENOUS at 12:36

## 2024-01-01 RX ADMIN — Medication 7 MCG/MIN: at 21:43

## 2024-01-01 RX ADMIN — CEFEPIME 1000 MG: 1 INJECTION, POWDER, FOR SOLUTION INTRAMUSCULAR; INTRAVENOUS at 04:50

## 2024-01-01 RX ADMIN — SODIUM CHLORIDE: 9 INJECTION, SOLUTION INTRAVENOUS at 04:14

## 2024-01-01 RX ADMIN — SODIUM BICARBONATE: 84 INJECTION, SOLUTION INTRAVENOUS at 22:02

## 2024-01-01 RX ADMIN — IPRATROPIUM BROMIDE AND ALBUTEROL SULFATE 1 DOSE: 2.5; .5 SOLUTION RESPIRATORY (INHALATION) at 11:13

## 2024-01-01 RX ADMIN — EPINEPHRINE 9 MCG/MIN: 1 INJECTION INTRAMUSCULAR; INTRAVENOUS; SUBCUTANEOUS at 00:23

## 2024-01-01 RX ADMIN — HEPARIN SODIUM 4000 UNITS: 1000 INJECTION INTRAVENOUS; SUBCUTANEOUS at 10:01

## 2024-01-01 RX ADMIN — CALCIUM GLUCONATE 1000 MG: 20 INJECTION, SOLUTION INTRAVENOUS at 13:31

## 2024-01-01 RX ADMIN — ALTEPLASE 1 MG: 2.2 INJECTION, POWDER, LYOPHILIZED, FOR SOLUTION INTRAVENOUS at 21:49

## 2024-01-01 RX ADMIN — EPINEPHRINE 0.1 MCG/KG/MIN: 1 INJECTION INTRAMUSCULAR; INTRAVENOUS; SUBCUTANEOUS at 00:18

## 2024-01-01 RX ADMIN — VANCOMYCIN HYDROCHLORIDE 2500 MG: 1 INJECTION, POWDER, LYOPHILIZED, FOR SOLUTION INTRAVENOUS at 05:42

## 2024-01-01 RX ADMIN — INSULIN LISPRO 1 UNITS: 100 INJECTION, SOLUTION INTRAVENOUS; SUBCUTANEOUS at 11:11

## 2024-01-01 RX ADMIN — Medication 65 MCG/MIN: at 16:14

## 2024-01-01 RX ADMIN — Medication 30 MCG/MIN: at 00:18

## 2024-01-01 RX ADMIN — LEVETIRACETAM 500 MG: 100 INJECTION, SOLUTION INTRAVENOUS at 20:45

## 2024-01-01 RX ADMIN — HEPARIN SODIUM 1000 UNITS: 1000 INJECTION INTRAVENOUS; SUBCUTANEOUS at 22:28

## 2024-01-01 RX ADMIN — SODIUM CHLORIDE: 9 INJECTION, SOLUTION INTRAVENOUS at 06:52

## 2024-01-01 RX ADMIN — Medication 5 MCG/MIN: at 21:30

## 2024-01-01 RX ADMIN — Medication 1500 ML/HR: at 10:31

## 2024-01-01 RX ADMIN — INSULIN HUMAN 5 UNITS: 100 INJECTION, SOLUTION PARENTERAL at 00:29

## 2024-01-01 RX ADMIN — DEXTROSE MONOHYDRATE 25 G: 25 INJECTION, SOLUTION INTRAVENOUS at 05:20

## 2024-01-01 RX ADMIN — HEPARIN SODIUM 6 UNITS/KG/HR: 10000 INJECTION, SOLUTION INTRAVENOUS at 10:04

## 2024-01-01 RX ADMIN — Medication 1500 ML/HR: at 12:44

## 2024-01-01 RX ADMIN — HEPARIN SODIUM 1200 UNITS: 1000 INJECTION INTRAVENOUS; SUBCUTANEOUS at 06:11

## 2024-01-01 RX ADMIN — Medication 3 MCG/MIN: at 21:13

## 2024-01-01 RX ADMIN — SODIUM BICARBONATE 50 MEQ: 84 INJECTION, SOLUTION INTRAVENOUS at 09:20

## 2024-01-01 RX ADMIN — Medication 65 MCG/MIN: at 01:42

## 2024-01-01 RX ADMIN — SODIUM BICARBONATE: 84 INJECTION, SOLUTION INTRAVENOUS at 11:39

## 2024-01-01 RX ADMIN — Medication 4 MCG/MIN: at 21:18

## 2024-01-01 ASSESSMENT — PULMONARY FUNCTION TESTS
PIF_VALUE: 1
PIF_VALUE: 30
PIF_VALUE: 34
PIF_VALUE: 29
PIF_VALUE: 41
PIF_VALUE: 31
PIF_VALUE: 30
PIF_VALUE: 37
PIF_VALUE: 32
PIF_VALUE: 34
PIF_VALUE: 32

## 2024-02-26 PROBLEM — E66.01 OBESITY, MORBID, BMI 40.0-49.9 (HCC): Status: ACTIVE | Noted: 2024-01-01

## 2024-02-26 PROBLEM — E87.5 HYPERKALEMIA: Status: ACTIVE | Noted: 2024-01-01

## 2024-02-26 PROBLEM — I46.9 CARDIAC ARREST (HCC): Status: ACTIVE | Noted: 2024-01-01

## 2024-02-26 PROBLEM — Z51.5 ENCOUNTER FOR PALLIATIVE CARE: Status: ACTIVE | Noted: 2024-01-01

## 2024-02-26 PROBLEM — J96.01 ACUTE RESPIRATORY FAILURE WITH HYPOXIA (HCC): Status: ACTIVE | Noted: 2024-01-01

## 2024-02-26 NOTE — PROCEDURES
Dialysis Catheter Placement Procedure Note    Performed by: Sunday Denny MD    Indication: hemodialysis    Consent: The family members were counseled regarding the procedure, its indications, risks, potential complications and alternatives, and any questions were answered. Consent was obtained to proceed.    Time out performed: Immediately prior to the procedure a \"time out\" was called to verify the correct patient, the correct procedure, equipment, support staff and site/side marked as required.      All elements of maximal sterile barrier techniques were followed.    Procedure: The patient was positioned appropriately and the skin and dialysis catheter in the right internal jugular vein was prepped with Chloraprep thoroughly. Local anesthesia was not performed.  Initially attempted to thread a wire through existing catheter but was unsuccessful and entire complex was removed. Pressure was held to ensure hemostasis. The patient was then redrapped and again the skin overlying the right IJ was prepped with Chloraprep.  A large bore needle was used to identify the vein.  A guide wire was then inserted into the vein through the needle. A double lumen catheter was then inserted into the vessel over the guide wire using the Seldinger technique.  All ports showed good, free flowing blood return and were flushed with saline solution.  The catheter was then securely fastened to the skin with sutures and covered with a sterile dressing.  A post procedure X-ray was ordered and showed good line position.    The patient tolerated the procedure well.    Complications: None   Attending Physician Statement  I have discussed the care of Denny Loomis, including pertinent history and exam findings,  with the resident. I have seen and examined the patient and the key elements of all parts of the encounter have been performed by me.  I agree with the assessment, plan and orders as documented by the resident with additions

## 2024-02-26 NOTE — PLAN OF CARE
I spoke with patient's Sister, Ailyn Coughlin, over the phone this evening. I discussed making the patient DNR-CCA with her and what the specifics of this code status mean. The ICU team has already discussed with the patient's sibling's Isabel Yeung Mike who agreed with DNR-CCA. His code status was changed with majority of siblings agreeing to DNR-CCA prior to speaking with Ailyn. Ailyn was also in agreeance with changing the patient's code status.     Selvin Haney MD  Internal Medicine Resident  2/26/24

## 2024-02-26 NOTE — ED PROVIDER NOTES
[facility]  EMERGENCY DEPARTMENT ENCOUNTER      Pt Name: Denny Loomis  MRN: [id number]  Birthdate 1965  Date of evaluation: 2/25/2024  Provider: Rafiq Galaviz MD    CHIEF COMPLAINT       Chief Complaint   Patient presents with    Cardiac Arrest     Last known well 1815. Patient found dusky at 1945, CPR started. Patient arrived at 2025. Bird airway in place. Patient in PEA         HISTORY OF PRESENT ILLNESS      Denny Loomis is a 58 y.o. male who presents to the emergency department via EMS from [facility], patient was last seen well in wheelchair at 1815 hrs., was found by nurse at 1945 hrs. unresponsive, not breathing and dusky in color, no pulse, CPR was initiated by [facility] and EMS was called.  Per phone call from [facility] nurse to ER, blood sugar was 248, and he had a heart rate of 148.    EMS states upon arrival, patient appeared to be in cardiac arrest and CPR initiated, IV access via IO left shoulder, manual compressions as patient GERD too large for Sage device, a Bird airway was placed as they were unable to visualize patient's posterior pharynx due to habitus, a/protocols for medications initiating patient received 3 epinephrines and route.  They state and route they did get patient's pulse back however patient has remained unresponsive throughout.  They did give Narcan without effect.  Blood sugar 250s        REVIEW OF SYSTEMS       Review of Systems   Unable to perform ROS: Patient unresponsive         PAST MEDICAL HISTORY     Past Medical History:   Diagnosis Date    ASHANTI (acute kidney injury) ([facility]) 05/20/2014    Atrial fibrillation ([facility])     Atrial flutter ([facility])     COPD (chronic obstructive pulmonary disease) ([facility]) 05/26/2014    Diabetes mellitus ([facility])     Hypertension     Sleep apnea          SURGICAL HISTORY       Past Surgical History:   Procedure Laterality Date    APPENDECTOMY      CERVICAL SPINE SURGERY      \"had two neck surgeries\"    FINGER AMPUTATION Left 7/5/2023

## 2024-02-26 NOTE — ED NOTES
Pt presents to the ED via Life Flight from [place] due to cardiac arrest.  Pt was found down earlier this evening about 1945 and has several rounds of CPR.   ROSC was achieved and then transferred to [facility]. Pt was intubated, and on Epinephrine and Levophed drips prior to transport.   OG tube and Perez catheter placed PTA.   Pt remains sedated with ETT tube in place. Perez catheter and OG tube in place. Art. Line in place.   Pt placed in bilateral wrist restraints.  EKG done, Blood cultures and labs obtained.

## 2024-02-26 NOTE — H&P
Critical Care - History and Physical Examination    Patient's name:  Denny Loomis  Medical Record Number: [id number]  Patient's account/billing number: [id number]  Patient's YOB: 1965  Age: 58 y.o.  Date of Admission: 2/25/2024 11:31 PM  Reason of ICU admission:   Date of History and Physical Examination: 2/26/2024      Primary Care Physician: Gordon Hendrix  Attending Physician:    Code Status: Full Code    Chief complaint: Cardiac arrest    Reason for ICU admission:   Cardiac arrest  Hyperkalemia  ASHANTI  Anoxic brain injury    History Of Present Illness:   History was obtained from chart review.      Denny Loomis is a 58 y.o. with a history of:  Cellulitis  Tobacco use  Type II DM  Hyperlipidemia  Hypertension  Obstructive sleep apnea  Paroxysmal A-fib  DVT with PE on Xarelto    Patient initially presented to [facility] from [facility] at 1945 patient was unresponsive not breathing densely colored with no pulses CPR was initiated at [facility] EMS was called.  Upon arrival from EMS CPR was initiated to obtain IV access and IO in the left shoulder was obtained a Bird airway was placed.  Patient received 3 rounds of epinephrine.  Patient was also given Narcan without effect blood sugars were in the 250s.  They were able to achieve ROSC the patient remained unresponsive.    Upon arrival to [place] emergency department bedside echo showed cardiac movement.  Bird airway was switched for an ET tube.  Chest x-ray confirmed above ET tube placement.  Patient blood pressure initially adequate upon arrival however continued to decrease and patient cardiac arrested.  Patient had multiple rounds of CPR [place] and they were able to ROSC.  Patient was started on Levophed and epinephrine.  Patient was then life flighted to [facility] ER.  And route patient received a right femoral art line and bicarb drip was discontinued started on epinephrine.  Central line was placed on the left for him.  Cardiology was

## 2024-02-26 NOTE — PLAN OF CARE
Problem: Discharge Planning  Goal: Discharge to home or other facility with appropriate resources  Outcome: Progressing     Problem: Respiratory - Adult  Goal: Achieves optimal ventilation and oxygenation  2/26/2024 0710 by Deidre Ruggiero RN  Outcome: Progressing  2/26/2024 0416 by Candi Yeh RCP  Flowsheets (Taken 2/26/2024 0416)  Achieves optimal ventilation and oxygenation:   Assess for changes in respiratory status   Position to facilitate oxygenation and minimize respiratory effort   Respiratory therapy support as indicated   Assess for changes in mentation and behavior   Oxygen supplementation based on oxygen saturation or arterial blood gases     Problem: Pain  Goal: Verbalizes/displays adequate comfort level or baseline comfort level  Outcome: Progressing     Problem: Confusion  Goal: Confusion, delirium, dementia, or psychosis is improved or at baseline  Description: INTERVENTIONS:  1. Assess for possible contributors to thought disturbance, including medications, impaired vision or hearing, underlying metabolic abnormalities, dehydration, psychiatric diagnoses, and notify attending LIP  2. [place] high risk fall precautions, as indicated  3. Provide frequent short contacts to provide reality reorientation, refocusing and direction  4. Decrease environmental stimuli, including noise as appropriate  5. Monitor and intervene to maintain adequate nutrition, hydration, elimination, sleep and activity  6. If unable to ensure safety without constant attention obtain sitter and review sitter guidelines with assigned personnel  7. Initiate Psychosocial CNS and Spiritual Care consult, as indicated  Outcome: Progressing

## 2024-02-26 NOTE — ED NOTES
2029 1mg epi given, pulse check, patient has pulse  2040 lost pulse, CPR started  2041 1mg EPI given  2042 pulse check, pulse found 63/49  2045 Intubated,7.5, 27 at the lip bagging  2049 pulse 88, xray for tube placement  2054 Epi drip started  2056 pulse weakened 1mg epi given  2132 patient gasped on his own

## 2024-02-26 NOTE — ED NOTES
Patient arrived in PEA. EMS gave 4epi 1 narcan. Entitle 99.   2029 1mg epi given, pulse check. Patient has pulse.  2040 CPR started  2041 1mg epi given  2042 pulse check, patient had pulse 97HR 63/49 BP  2045 intubated 7.5 , 27 at the lip. Bagging  2049 pulse 88  2049 xray for tube placement  2054 epi infusion started  2056 pulse weakening 1mg epi given  2132 patient gasped

## 2024-02-26 NOTE — PLAN OF CARE
Problem: Respiratory - Adult  Goal: Achieves optimal ventilation and oxygenation  2/26/2024 0854 by Katya Celaya RCP  Outcome: Progressing

## 2024-02-26 NOTE — ED PROVIDER NOTES
Faculty Sign-Out Attestation  Handoff taken on the following patient from prior Attending Physician: Nunu  Note Started: 1:30 AM EST    I was available and discussed any additional care issues that arose and coordinated the management plans with the resident(s) caring for the patient during my duty period. Any areas of disagreement with resident’s documentation of care or procedures are noted on the chart. I was personally present for the key portions of any/all procedures during my duty period. I have documented in the chart those procedures where I was not present during the key portions.    Anoxic brain, blow pupil  Critical care consulted,   Ct head pending, central line placed    CT head concerning for global anoxic injury  ICU admit,     Leodan Caldwell,   02/26/24 1123    
out of hospital cardiac arrest with initial rhythm of PEA.  He had ROSC at the outside ED and then arrested a second time.  He had multiple doses of IV epinephrine.  After ROSC he is then on IV pressors.  He had a prehospital Bird airway there was changed to endotracheal tube with confirmation on chest x-ray and end-tidal CO2 waveform.  The airway had swelling of the tongue noted.  He had no awakening or reflexes or activity during this whole postarrest situation.  Chest x-ray shows right lung parenchymal changes uncertain infiltrate versus pneumonia.  He had a mixed metabolic and respiratory acidosis.  He was on bicarbonate drip which was recently discontinued.  He is on IV pressors including epinephrine and vasopressin.  He has a left humeral IO needle in place.  Has a peripheral IV.  He has a right femoral arterial line in place.  At the outside hospital his potassium was only slightly elevated at 5.5.  EKG shows a right bundle branch block type pattern.  Past medical history is significant for COPD, venous stasis, ASHANTI, atrial fibrillation, hypertension, sleep apnea.  On arrival patient is borderline saturations between 88 and 92% with good waveform on pulse oximeter, normotensive but after vasopressor infusion, having gasping respirations and then long pauses.  End-tidal CO2 is slightly elevated.  Breath sounds are symmetrical.  Pupils are nonreactive with right side larger.  There is no apparent corneal or deep tendon reflexes.  There is no response to pain.  Abdomen is protuberant but now softer after NG tube is placed which now has brown material.  There is bilateral lower extremity edema with subtle venous stasis changes.  On point-of-care chemistries he has pH of 7.1+, potassium over 7, normal glucose, elevated lactate.  He is receiving dextrose, insulin, calcium gluconate for the hyperkalemia.  ECG shows right bundle branch block with first-degree AVB, prominent T waves in inferior leads.  Impression is 
but occasionally words are mis-transcribed.)

## 2024-02-27 NOTE — FLOWSHEET NOTE
Phone calls made to 4 siblings by Dr Smith regarding code status. All were given updates on pts condition and explanations of different code status's. All (Anjana, Ailyn, Isabel, and Darrian) were agreeable to pt being a DNR CCA at this time. Dr Smith to place order.

## 2024-02-27 NOTE — PLAN OF CARE
Problem: Discharge Planning  Goal: Discharge to home or other facility with appropriate resources  2/27/2024 0152 by Caity Macias RN  Outcome: Progressing     Problem: Pain  Goal: Verbalizes/displays adequate comfort level or baseline comfort level  2/27/2024 0152 by Caity Macias RN  Outcome: Progressing     Problem: Confusion  Goal: Confusion, delirium, dementia, or psychosis is improved or at baseline  Description: INTERVENTIONS:  1. Assess for possible contributors to thought disturbance, including medications, impaired vision or hearing, underlying metabolic abnormalities, dehydration, psychiatric diagnoses, and notify attending LIP  2. [place] high risk fall precautions, as indicated  3. Provide frequent short contacts to provide reality reorientation, refocusing and direction  4. Decrease environmental stimuli, including noise as appropriate  5. Monitor and intervene to maintain adequate nutrition, hydration, elimination, sleep and activity  6. If unable to ensure safety without constant attention obtain sitter and review sitter guidelines with assigned personnel  7. Initiate Psychosocial CNS and Spiritual Care consult, as indicated  2/27/2024 0152 by Caity Macias RN  Outcome: Progressing     Problem: Skin/Tissue Integrity  Goal: Absence of new skin breakdown  Description: 1.  Monitor for areas of redness and/or skin breakdown  2.  Assess vascular access sites hourly  3.  Every 4-6 hours minimum:  Change oxygen saturation probe site  4.  Every 4-6 hours:  If on nasal continuous positive airway pressure, respiratory therapy assess nares and determine need for appliance change or resting period.  2/27/2024 0152 by Caity Macias RN  Outcome: Progressing     Problem: Safety - Adult  Goal: Free from fall injury  2/27/2024 0152 by Caity Macias RN  Outcome: Progressing  Flowsheets (Taken 2/26/2024 2019)  Free From Fall Injury: Instruct family/caregiver on patient safety

## 2024-02-27 NOTE — FLOWSHEET NOTE
Phone numbers for siblings:    Anjana Dileep [phone]  Ailyn Ced [phone]  Isabel Cee [phone]  Darrian Loomis [phone]  Gerhard Loomis no number available.     Niece Xochilt Wrigth [phone]. Lives in [place].

## 2024-02-27 NOTE — PLAN OF CARE
The ICU team and I discussed with the patient's brother, Darrian, and Sister, Isabel, at the bedside today about patient's status. We discussed that he has multiorgan failure and the patient remains severely critical. We also mentioned the patient's poor neurological status in regard to absent gag reflexes, absent  corneal reflexes, absent cough reflexes, the patient not initiating breaths independently when setting ventilator to 6 breaths per minute for an entire minute, and not making purposeful movements.     I discussed with Darrian and Isabel at bedside about a recommendation to change the patient's code status to DNR-CC. I briefly explained about what DNR-CC means and ultimately results in withdrawal of care.  Both Darrian and Isabel agreed at bedside to change Denny's Code status to DNR-CC. This conversation was made in the presence of nurse Yuliya Page RN.     I also spoke on the phone, separately, with the patient's Sisters, Ailyn and Anjana.     I discussed with Ailyn and Nessannmarie, separately, about the recommendation to change Denny's code status to DNR-CC and what this code status entails. I also mentioned that due to his cardiac arrest numerous of his organs and brain have been severely affected by ischemia and organ failure. Both Ailyn and Anjana agreed to change the patient's code status to DNR-CC, separately. These conversations were made in the presence of nurse Yuliya Page RN.      The patient's fifth sibling, Chip, is estranged from the family and is not able to be contacted at this time.     At this time, the patient's code status was changed to DNR-CC. This code status change was agreed upon by the patient's four of five sibling: Anjana, Ailyn, Isabel, and Darrian.     Comfort Care were made to keep Denny comfortable.     We will give the family time to visit the patient before we withdraw from Denny.         Selvin Haney MD  Internal Medicine Resident PGY1  Medical ICU service  2/27/24.0  Attending Physician

## 2024-02-27 NOTE — CARE COORDINATION
Unable to preform CM assessment. Pt intubated. No family at bedside. Will attempt later as time allows.

## 2024-02-27 NOTE — PLAN OF CARE
Problem: Respiratory - Adult  Goal: Achieves optimal ventilation and oxygenation  2/27/2024 0809 by Katya Celaya RCP  Outcome: Progressing

## 2024-02-27 NOTE — PROGRESS NOTES
Pharmacy Note     Renal Dose Adjustment    Denny Loomis is a 58 y.o. male. Pharmacist assessment of renally cleared medications.    Recent Labs     02/27/24  0410 02/27/24  1016   BUN 50* 48*       Recent Labs     02/27/24  0410 02/27/24  1016   CREATININE 2.8* 2.5*       Estimated Creatinine Clearance: 48 mL/min (A) (based on SCr of 2.5 mg/dL (H)).    Height:   Ht Readings from Last 1 Encounters:   02/26/24 1.803 m (5' 10.98\")     Weight:  Wt Readings from Last 1 Encounters:   02/27/24 (!) 150.1 kg (330 lb 14.4 oz)       The following medication dose has been adjusted based upon renal function per P&T Guidelines:             Cefepime changed to 2 g IV Q8H for CVVHD.      
    Pharmacist Review and Automatic Dose Adjustment of Prophylactic Enoxaparin    Reviewed reason(s) for admission/hospital problem list    The reviewing pharmacist has made an adjustment to the ordered enoxaparin dose or converted to UFH per the approved [facility] protocol and table as identified below.        Denny Loomis is a 58 y.o. male.     Recent Labs     02/25/24 2116 02/25/24  2350 02/26/24  0158   CREATININE 1.9* 2.5* 2.5*       Estimated Creatinine Clearance: 49 mL/min (A) (based on SCr of 2.5 mg/dL (H)).    Recent Labs     02/25/24 2116 02/26/24  0158   HGB 13.4* 15.4   HCT 43.9 50.6*    380     Recent Labs     02/25/24 2116 02/26/24  0158   INR 1.5 1.6       Height:   Ht Readings from Last 1 Encounters:   07/05/23 1.803 m (5' 11\")     Weight:  Wt Readings from Last 1 Encounters:   02/25/24 (!) 156.9 kg (346 lb)               Plan: Based upon the patient's weight and renal function    Ordered: Enoxaparin 40mg SUBQ Daily    Changed/converted to    New Order: Enoxaparin 40mg SUBQ BID      Thank you,  Harjinder Delgado, [facility]  2/26/2024, 4:13 AM   
   02/27/24 0454   Ventilator Settings   FiO2  (S)  50 %     PaO2 114  
  [facility] [place] Note    PATIENT NAME: Denny Loomis    Shift date: 2/25/24  Shift day: Sunday   Shift # 3    Room # 13/13   Name: Denny Loomis            Age: 58 y.o.  Gender: male          Hoahaoism: Other Moravian  Place of Pentecostal:     Trauma/Incident type: Full Arrest  Admit Date & Time: 2/25/2024 11:31 PM  TRAUMA NAME:     ADVANCE DIRECTIVES IN CHART?  No    NAME OF DECISION MAKER:     RELATIONSHIP OF DECISION MAKER TO PATIENT:     PATIENT/EVENT DESCRIPTION:  Denny Loomis is a 58 y.o. male who arrived via EMS in Cardiac Arrest.  Patient arrived from [place].  Patient was intubated at 2045. Pt to be admitted to 13/13.         SPIRITUAL ASSESSMENT-INTERVENTION-OUTCOME:   was ministry of presence.   met patient's nieces' Rachel Phillipner [phone] and Tiffani Coughlin [phone] in Triage waiting area. Family members received medical update from Dr. Dorinda Veloz.  prayed with family members.   PATIENT BELONGINGS:  No belongings noted    ANY BELONGINGS OF SIGNIFICANT VALUE NOTED:  NO    REGISTRATION STAFF NOTIFIED?  No      WHAT IS YOUR SPIRITUAL CARE PLAN FOR THIS PATIENT?:   Chaplains are available 24/7 via [employer].    Electronically signed by Chaplain Garcia, on 2/26/2024 at 2:59 AM.  [facility]  [phone]   
  Cefepime Extended Interval Interchange    The following ordered dose of Cefepime has been changed to optimize its pharmacodynamic profile as approved by the Patient Care Review Committee.    Ordered Dose    _x_ 1 gm IV every 8-12 hrs  (30 minute infusion)      __ 2 gm  IV every 8-12 hrs (30 minute infusion)      CrCl Dose   >60 1-2 gm q8-12hrs over 4 hours   30-59 or CRRT 1-2gm [id number] over 4 hours   <30 500mg-2gm q24hrs over 4 hours or 1gm q12hrs over 4 hours   <10 or HD 500mg-1gm q24hrs over 4 hours       New Dose    Cefepime   1 gm  IV q 24 hrs  over 4 hours.        Pharmacists should be contacted for issues concerning drug compatibility with multiple IV medications.  All doses will be prepared using 50ml bag to be infused over 4-hours at a rate of 12.5ml/hr.    Thank You,  Harjinder Delgado, RPH2/26/12613:29 AM    
  PALLIATIVE CARE PROGRESS NOTE     NAME:  Denny Loomis  MEDICAL RECORD NUMBER:  [id number]  AGE: 58 y.o.   GENDER: male  : 1965  TODAY'S DATE:  2024  Room: 3018/3018-01    Reason For Consult:  Goals of care evaluation  Distress management  Symptom Management  Guidance and support  Facilitate communications  Assistance in coordinating care  Recommendations for the above    Plan      Palliative Interaction:  The patient was seen and examined this morning.  He remains unresponsive.   Family is at bedside, discussing with organ donation at this time, as family is planning to withdraw care.  The patient's sister, Isabel, is present at bedside. She notes that one of her brothers is also around.  She informs me that the patient's child (13 years old) is planning on coming to bedside in the next day or so. Offered to consult child bereavement . She let us know that she will think about this.  Discussed having hospice involved for bereavement benefits as well. This will be something that needs followed up on.   Family discussing next steps and timing of withdrawing care.   Comfort meds reviewed and adjusted with resident team.  Palliative will continue to follow.      IMPRESSION/ PLAN  Symptom management/pain control    We feel the patient's symptoms are being controlled. Their current regimen has been reviewed by myself and discussed with the staff. We recommend adjusting their medications as follows: adjusted morphine to dilaudid    Goals of care evaluation  The patient goals of care are support for family/caregiver  Long discussion to ensure the patient's and family's understanding of goals of care, and theconcept of palliative care.    Code Status:  DNR-CC    Other Recommendations - code status may need to be changed, pending organ donation      History of Present Illness     HISTORY OF PRESENT ILLNESS:   The patient is a 58 y.o. male who initially presented to [facility] ED with CPR in progress 
 provided support and Zoroastrian prayer prior to extubation. Family was gathered around the bed and shared last words with patient as they awaited respiratory to arrive. Family was receptive to spiritual health visit and expressed gratitude for support.     Electronically signed by Chaplain Caity, on 2/27/2024 at 4:29 PM.  [facility]  [facility]  [phone]   
Comprehensive Nutrition Assessment    Type and Reason for Visit:   (vent check)    Nutrition Recommendations/Plan:   If desired to start TF, suggest Renal at goal rate 50 mL/hr continuous.   Will monitor for GOC, start of nutrition.      Malnutrition Assessment:  Malnutrition Status:  At risk for malnutrition (Comment) (02/26/24 0090)    Context:  Acute Illness     Findings of the 6 clinical characteristics of malnutrition:  Energy Intake:  Mild decrease in energy intake (Comment)  Weight Loss:  No significant weight loss     Body Fat Loss:  Unable to assess     Muscle Mass Loss:  Unable to assess    Fluid Accumulation:  Mild     Strength:  Not Performed    Nutrition Assessment:    57 yo M adm cardiac arrest. PMH significant for T2DM, HLD, HTN. Pt intubated, no propofol. LBM 2/26. +2 generalized/extremity edema noted. +FMS. +OGT.    Nutrition Related Findings:    labs/meds reviewed: K+ 5.8 mmol/L. Wound Type: None       Current Nutrition Intake & Therapies:    Average Meal Intake: NPO  Average Supplements Intake: NPO  Diet NPO    Anthropometric Measures:  Height: 180.3 cm (5' 10.98\")  Ideal Body Weight (IBW): 172 lbs (78 kg)       Current Body Weight: 148.4 kg (327 lb 2.6 oz), 190.2 % IBW.    Current BMI (kg/m2): 45.7  Usual Body Weight: 117.5 kg (259 lb 0.7 oz) (7/5/23)  % Weight Change (Calculated): 26.3  Weight Adjustment For: No Adjustment                 BMI Categories: Obese Class 3 (BMI 40.0 or greater)    Estimated Daily Nutrient Needs:  Energy Requirements Based On: Formula  Weight Used for Energy Requirements: Current  Energy (kcal/day): 2100 to 2400 kcal/day  Weight Used for Protein Requirements: Ideal  Protein (g/day): 130 to 150 g/day     Fluid (ml/day): per MD    Nutrition Diagnosis:   Inadequate oral intake related to acute injury/trauma as evidenced by NPO or clear liquid status due to medical condition, intubation    Nutrition Interventions:   Food and/or Nutrient Delivery:  (If desired to start 
Critical Care Team - Daily Progress Note      Date and time: 2/27/2024 8:37 AM  Patient's name:  Denny Loomis  Medical Record Number: [id number]  Patient's account/billing number: [id number]  Patient's YOB: 1965  Age: 58 y.o.  Date of Admission: 2/25/2024 11:31 PM  Length of stay during current admission: 1      Primary Care Physician: Gordon Hendrix  ICU Attending Physician:      Code Status: DNR-CCA    Reason for ICU admission:   Chief Complaint   Patient presents with    Cardiac Arrest       Subjective:     Today:           No response   Pupils are fixed and dilated, not responding to light  Absent corneal, gag, and cough reflexes.   NSE pending  Not on sedation     HR- 84 - 108   MAP - 68 - 94     Remains intubated.  Vent- 14 / 710 / 8 / 50  ABG - 7.295 / 38 / 114 / 18.5  Bloody output from OG     Patient remains on CRRT  TTE- 50-55% EF. RV mildly dilated   Occult gastric/duodenal blood positive     Bicarb injections added due to metabolic acidosis.   Continued on cefepime for aspiration pneumonia and probable UTI   US renal- unremarkable     Na- 136  K- 4.6  Alt   Ast-  Wbc- 25  Hgb - 13      AWAKE & FOLLOWING COMMANDS:  [x] No   [] Yes    CURRENT VENTILATION STATUS:     [x] Ventilator  [] BIPAP  [] Nasal Cannula [] Room Air      IF INTUBATED, ET TUBE MARKING AT LOWER LIP:       cms    SECRETIONS Amount:  [] Small [] Moderate  [x] Large  [] None  Color:     [] White [] Colored  [x] Bloody    SEDATION:  RAAS Score:  [] Propofol gtt  [] Versed gtt  [] Ativan gtt   [x] No Sedation    PARALYZED:  [x] No    [] Yes    DIARRHEA:                [x] No                [] Yes  (C. Difficile status: [] positive                                                                                                                       [] negative                                                                                                                     [] pending)    VASOPRESSORS:  [] No    [] Yes    
[facility] [place]   Patient Death Note  DEATH   Shift date: 2024    Shift day: Tuesday  Shift #: 2                 Room # 3018/3018-01   Name: Denny Loomis            Age: 58 y.o.  Gender: male          Methodist: Islam      Place of Church:   Admit Date & Time: 2024 11:31 PM     Referral: Nurse   Actual date of death: 2024   TOD: 1618       SITUATION AT DEATH:  Patient was terminally extubated after cardiopulmonary arrest and multiple organ failure.     IS THIS A 'S CASE?  No    SPIRITUAL/EMOTIONAL INTERVENTION:  (Please note all relevant care assessments and interventions and duties performed. Also include important family names, numbers, and dynamics)      Family Received Grief Packet?  No, but will mail.       NAME AND PHONE NUMBER OF DOCTOR SIGNING DEATH CERTIFICATE:  Dr. Brando Smith, [phone].     spoke to representative of  [facility] indicating family's choice for their services.  called  home on  at 1750.      Copy of COMPLETED Release of Body Form Received?  Yes    Patient's belongings: None     HOME:  Name: [facility]  City: [place]  Phone Number: [phone]    NEXT OF KIN:  Name: Lisset Cee  Relationship: Sister  Street Address: [address] [place] [address]  City: [place]  State: [place]  Zip code: [address]   Phone Number: [phone]    ANY FOLLOW-UP NEEDED?  No    IF SO, WHAT?  N/A    Electronically signed by Chaplain Homero, on 2024 at 6:08 PM.  [facility]  [phone]      24 1802   Encounter Summary   Encounter Overview/Reason  Grief, Loss, and Adjustments   Service Provided For:   (Family not present)   Referral/Consult From: Nurse   Complexity of Encounter Low   Begin Time 1700   End Time  1750   Total Time Calculated 50 min   Grief, Loss, and Adjustments   Type Death   Assessment/Intervention/Outcome   Assessment Unable to assess;Other 
[facility]   Pharmacy Pharmacokinetic Monitoring Service - Vancomycin     Denny Loomis is a 58 y.o. male starting on vancomycin therapy for sepsis. Pharmacy consulted by Dr. Daniel for monitoring and adjustment.    Target Concentration: Dosing based on anticipated concentration <15 mg/L due to renal impairment/insufficiency    Additional Antimicrobials: Cefepime    Pertinent Laboratory Values:   Wt Readings from Last 1 Encounters:   02/25/24 (!) 156.9 kg (346 lb)     Temp Readings from Last 1 Encounters:   02/26/24 (!) 101.2 °F (38.4 °C) (Oral)     Estimated Creatinine Clearance: 49 mL/min (A) (based on SCr of 2.5 mg/dL (H)).  Recent Labs     02/25/24  2116 02/25/24  2350 02/26/24  0158   CREATININE 1.9* 2.5* 2.5*   BUN 35*  --  44*   WBC 31.9*  --  43.7*     Procalcitonin: n/a    Pertinent Cultures:  Culture Date Source Results        MRSA Nasal Swab: N/A. Non-respiratory infection.    Plan:  Concentration-guided dosing due to renal impairment/insufficiency  Start vancomycin 2500mg IVPB x 1 dose  Renal labs as indicated   Vancomycin concentration ordered for  @    Pharmacy will continue to monitor patient and adjust therapy as indicated    Thank you for the consult,  Harjinder Delgado RPH  2/26/2024 4:34 AM    
NEPHROLOGY PROGRESS NOTE      ASSESSMENT     Acute kidney injury oligoanuric secondary to ischemic ATN from hypoperfusion related to cardiac arrest/circulatory shock and nephrotoxic ATN from contrast exposure-CRRT initiated today 26th February.  Baseline creatinine unknown  Hyperkalemia secondary to acute kidney injury/reduced distal sodium delivery/ACE inhibitor use  High anion gap metabolic acidosis secondary to acute kidney injury  S/p cardiac arrest.Echo   Circulatory shock on pressors exclude sepsis  Acute hypoxic respiratory failure on mechanical ventilation  Acute hepatitis likely secondary to shock  Type 2 diabetes  History of atrial fibrillation on anticoagulation  History of sleep apnea    PLAN     CRRT - Net UF 25 based on hemodynamics 2 K/ Qd 1.5 L/Qb 200 with prefilter bicarb  Increase Midodrine  Follow up pending serologies      SUBJECTIVE     Transferred from [facility] after witnessed cardiac arrest in nursing home followed by another episode in hospital at [place].  Currently on CRRT running even with 1K bath and prefilter isotonic saline no extracorporeal circuit issues overnight  Continues to be on 3 pressors  On mechanical ventilation FiO2 50% PEEP of 8  Echocardiogram essentially unremarkable  Urine output decent without diuretics    OBJECTIVE     Vitals:    02/27/24 0815 02/27/24 0830 02/27/24 0838 02/27/24 0845   BP:       Pulse: 88 89 89 90   Resp: 14 14 14 14   Temp: 97.2 °F (36.2 °C) 97.2 °F (36.2 °C)  97 °F (36.1 °C)   TempSrc:       SpO2: 98% 98% 97% 97%   Weight:       Height:         24HR INTAKE/OUTPUT:    Intake/Output Summary (Last 24 hours) at 2/27/2024 0903  Last data filed at 2/27/2024 0649  Gross per 24 hour   Intake 4933.05 ml   Output 3481 ml   Net 1452.05 ml       General appearance:present   Respiratory::vesicular breath sounds,no wheeze/crackles  Cardiovascular:S1 S2 normal,no gallop or organic murmur.  Abdomen:Non tender/non distended.Bowel sounds present  Extremities: 
PHARMACY NOTE:    The electrolyte replacement protocol for potassium/magnesium has been discontinued per P&T guidelines because the patient has reduced renal function (CrCl < 30 mL/min).      The patient's most recent potassium & magnesium levels are:  Recent Labs     02/25/24 2116 02/26/24  0158   K 5.5* 6.8*     Estimated Creatinine Clearance: 49 mL/min (A) (based on SCr of 2.5 mg/dL (H)).    For patients with decreased renal function (below 30ml/min) needing potassium/magnesium supplementation, please order individual bolus doses with appropriate monitoring.      Please contact the inpatient pharmacy with any concerns.  Thank you.    
Patient admitted on Mechanical Ventilator Protocol.     Patient placed on the ventilator on settings as charted on flowsheeet.         Ventilator Bronchodilator assessment    Breath sounds: diminished   Inspiratory Pressure: 38   Plateau Pressure: 27    Patient assessed at level 4          []    Bronchodilator Assessment    BRONCHODILATOR ASSESSMENT SCORE  Score 0 (Home) 1 2 3 4   Breath Sounds   []  Chronic Ventilator: Patient at baseline [x]  Mild Wheezes/ Clear []  Intermittent wheezes with good air entry []  Bilateral/unilateral wheezing with diminished air entry []  Insp/Exp wheeze and/or poor aeration   Ventilator Pressures   []  Chronic Ventilator []  Insp. Pressure less than 25 cm H20 []  Insp. Pressure less than 25 cm H20 []  Insp. Pressure exceeds 25 cm H20 [x]  Insp. Pressure exceeds 30 cm H20   Plateau Pressure []  NA   []  Plateau Pressure less than 4  []  Plateau Pressure less than or equal to 5 []  Plateau Pressure greater than or equal to 6 [x]  Plateau Pressure greater than or equal to 8       Candi Yeh RCP  6:01 AM  
Patient extubated per physician order. Patient extubated in usual fashion. Patient placed on  room air.     Katya Celaya RCP   4:32 PM  
[facility] [place]  [place]GRESS NOTE    Shift date: 2/26/2024  Shift day: Monday   Shift # 1    Room # 3018/3018-01   Name: Denny Loomis                Lutheran: Episcopal   Place of Zoroastrian:     Referral:  Nurse, other     Admit Date & Time: 2/25/2024 11:31 PM    Assessment:  Denny Loomis is a 58 y.o. male in the hospital because of cardiac arrest.  arrived on unit for spiritual care consult requested for family.  Upon arriving on the floor, writer observes two nieces of patient moving toward family waiting room, and followed them in. Nieces were sad, tearful, receptive, communicative with .      Intervention:  Writer introduced self and title as , present to offer emotional and spiritual support.   initially offered support to two nieces, then a sister, brother-in-law, and another niece over time. Provided: active listening; calming, supportive, pastoral presence; words of affirmation and encouragement; a prayer blanket; prayers.    Outcome:  Family members were receptive to and expressed gratitude for the 's presence and support.    Plan:  Chaplains will remain available to offer spiritual and emotional support as needed.      Electronically signed by Chaplain Homero, on 2/26/2024 at 10:51 AM.  [facility]  [phone]     02/26/24 1046   Encounter Summary   Encounter Overview/Reason  Follow-up   Service Provided For: Patient and family together   Referral/Consult From: Family;Other    Support System Family members   Last Encounter  02/26/24   Complexity of Encounter Moderate   Begin Time 0900   End Time  1000   Total Time Calculated 60 min   Crisis   Type Emotional distress;Family Care;Follow up   Spiritual/Emotional needs   Type Spiritual Support;Emotional Distress   Grief, Loss, and Adjustments   Type Anticipatory Grief;New Diagnosis   Assessment/Intervention/Outcome   Assessment Sad;Tearful 
mL IVPB **OR** sodium phosphate 18 mmol in sodium chloride 0.9 % 500 mL IVPB **OR** sodium phosphate 24 mmol in sodium chloride 0.9 % 500 mL IVPB, acetaminophen **OR** acetaminophen, glucose, polyethylene glycol, ondansetron **OR** ondansetron, heparin (porcine), heparin (porcine)    VITALS:  Temperature Range: Temp: 97.5 °F (36.4 °C) Temp  Av.1 °F (37.8 °C)  Min: 96.8 °F (36 °C)  Max: 104.7 °F (40.4 °C)  BP Range: Systolic (24hrs), Av , Min:102 , Max:133     Diastolic (24hrs), Av, Min:53, Max:87    Pulse Range: Pulse  Av.9  Min: 84  Max: 160  Respiration Range: Resp  Av.2  Min: 10  Max: 22  Current Pulse Ox: SpO2: 95 %  24HR Pulse Ox Range: SpO2  Av.8 %  Min: 93 %  Max: 99 %  Patient Vitals for the past 12 hrs:   BP Temp Pulse Resp SpO2 Weight   24 1430 -- 97.5 °F (36.4 °C) (!) 160 14 95 % --   24 1415 -- 97.5 °F (36.4 °C) (!) 143 14 95 % --   24 1400 -- 97.5 °F (36.4 °C) (!) 154 14 95 % --   24 1345 -- 97.3 °F (36.3 °C) (!) 131 14 96 % --   24 1330 -- 97.3 °F (36.3 °C) (!) 147 14 96 % --   24 1315 -- 97.2 °F (36.2 °C) (!) 136 14 96 % --   24 1300 -- 97.2 °F (36.2 °C) (!) 119 14 96 % --   24 1245 -- 97 °F (36.1 °C) (!) 134 14 95 % --   24 1230 -- 97 °F (36.1 °C) (!) 157 14 95 % --   24 1215 -- 97 °F (36.1 °C) (!) 103 14 95 % --   24 1200 124/70 96.8 °F (36 °C) (!) 105 14 96 % --   24 1145 -- 96.8 °F (36 °C) 98 14 96 % --   24 1130 -- 96.8 °F (36 °C) 97 14 96 % --   24 1118 -- -- 96 13 95 % --   24 1115 -- 96.8 °F (36 °C) 96 14 94 % --   24 1113 -- -- 96 14 94 % --   24 1100 -- 96.8 °F (36 °C) 95 14 95 % --   24 1045 -- 96.8 °F (36 °C) 95 14 95 % --   24 1030 -- 96.8 °F (36 °C) 93 14 96 % --   24 1015 -- 97 °F (36.1 °C) 93 14 95 % --   24 1000 -- 97 °F (36.1 °C) 91 14 94 % --   24 0945 -- 97 °F (36.1 °C) 90 14 94 % --   24 0930 -- 97 °F (36.1 °C) 98

## 2024-02-27 NOTE — FLOWSHEET NOTE
Very subtile rhythmic jerking of face noted. Critical care made aware and at bedside to assess pt.

## 2024-02-27 NOTE — PLAN OF CARE
Problem: Discharge Planning  Goal: Discharge to home or other facility with appropriate resources  Outcome: Completed     Problem: Respiratory - Adult  Goal: Achieves optimal ventilation and oxygenation  2/27/2024 1845 by Yuliya Page RN  Outcome: Completed  2/27/2024 0809 by Katya Celaya RCP  Outcome: Progressing     Problem: Pain  Goal: Verbalizes/displays adequate comfort level or baseline comfort level  Outcome: Completed     Problem: Confusion  Goal: Confusion, delirium, dementia, or psychosis is improved or at baseline  Description: INTERVENTIONS:  1. Assess for possible contributors to thought disturbance, including medications, impaired vision or hearing, underlying metabolic abnormalities, dehydration, psychiatric diagnoses, and notify attending LIP  2. [place] high risk fall precautions, as indicated  3. Provide frequent short contacts to provide reality reorientation, refocusing and direction  4. Decrease environmental stimuli, including noise as appropriate  5. Monitor and intervene to maintain adequate nutrition, hydration, elimination, sleep and activity  6. If unable to ensure safety without constant attention obtain sitter and review sitter guidelines with assigned personnel  7. Initiate Psychosocial CNS and Spiritual Care consult, as indicated  Outcome: Completed     Problem: Skin/Tissue Integrity  Goal: Absence of new skin breakdown  Description: 1.  Monitor for areas of redness and/or skin breakdown  2.  Assess vascular access sites hourly  3.  Every 4-6 hours minimum:  Change oxygen saturation probe site  4.  Every 4-6 hours:  If on nasal continuous positive airway pressure, respiratory therapy assess nares and determine need for appliance change or resting period.  Outcome: Completed     Problem: Safety - Adult  Goal: Free from fall injury  Outcome: Completed     Problem: Chronic Conditions and Co-morbidities  Goal: Patient's chronic conditions and co-morbidity symptoms are monitored

## 2024-02-27 NOTE — FLOWSHEET NOTE
Attempting to start CRRT. Was able to pull heparin off arterial lumen but unable to pull heparin from venous lumen. Critical care aware.

## 2024-02-27 NOTE — DEATH NOTES
Death Pronouncement Note  Patient's Name: Denny Loomis   Patient's YOB: 1965  MRN Number: [id number]    Admitting Provider: Jensen Mayorga MD  Attending Provider: Dr. Brando Smith MD    Patient was examined and the following were absent: Pulses, Blood Pressure, and Respiratory effort    I declared the patient dead on 2/27/2024 at 4:18 PM    Preliminary Cause of Death: Cardiac arrest     Electronically signed by Selvin Haney MD on 2/27/24 at 4:23 PM EST

## 2024-02-27 NOTE — PLAN OF CARE
Problem: Discharge Planning  Goal: Discharge to home or other facility with appropriate resources  2/26/2024 2002 by Caitlin Wolf RN  Outcome: Progressing     Problem: Respiratory - Adult  Goal: Achieves optimal ventilation and oxygenation  2/26/2024 2002 by Caitlin Wolf RN  Outcome: Progressing     Problem: Pain  Goal: Verbalizes/displays adequate comfort level or baseline comfort level  2/26/2024 2002 by Caitlin Wolf RN  Outcome: Progressing     Problem: Confusion  Goal: Confusion, delirium, dementia, or psychosis is improved or at baseline  Description: INTERVENTIONS:  1. Assess for possible contributors to thought disturbance, including medications, impaired vision or hearing, underlying metabolic abnormalities, dehydration, psychiatric diagnoses, and notify attending LIP  2. [place] high risk fall precautions, as indicated  3. Provide frequent short contacts to provide reality reorientation, refocusing and direction  4. Decrease environmental stimuli, including noise as appropriate  5. Monitor and intervene to maintain adequate nutrition, hydration, elimination, sleep and activity  6. If unable to ensure safety without constant attention obtain sitter and review sitter guidelines with assigned personnel  7. Initiate Psychosocial CNS and Spiritual Care consult, as indicated  2/26/2024 2002 by Caitlin Wolf RN  Outcome: Progressing     Problem: Skin/Tissue Integrity  Goal: Absence of new skin breakdown  Description: 1.  Monitor for areas of redness and/or skin breakdown  2.  Assess vascular access sites hourly  3.  Every 4-6 hours minimum:  Change oxygen saturation probe site  4.  Every 4-6 hours:  If on nasal continuous positive airway pressure, respiratory therapy assess nares and determine need for appliance change or resting period.  Outcome: Progressing     Problem: Safety - Adult  Goal: Free from fall injury  Outcome: Progressing     Problem: Chronic Conditions and Co-morbidities  Goal:

## 2024-02-27 NOTE — PLAN OF CARE
Problem: Respiratory - Adult  Goal: Achieves optimal ventilation and oxygenation  2/26/2024 2014 by Ary Benitez RCP  Outcome: Progressing   BRONCHOSPASM/BRONCHOCONSTRICTION     [x]         IMPROVE AERATION/BREATH SOUNDS  [x]   ADMINISTER BRONCHODILATOR THERAPY AS APPROPRIATE  [x]   ASSESS BREATH SOUNDS  []   IMPLEMENT AEROSOL/MDI PROTOCOL  [x]   PATIENT EDUCATION AS NEEDED  PROVIDE ADEQUATE OXYGENATION WITH ACCEPTABLE SP02/ABG'S    [x]  IDENTIFY APPROPRIATE OXYGEN THERAPY  [x]   MONITOR SP02/ABG'S AS NEEDED   [x]   PATIENT EDUCATION AS NEEDED  Problem: OXYGENATION/RESPIRATORY FUNCTION  Goal: Patient will maintain patent airway  Outcome: Ongoing  Goal: Patient will achieve/maintain normal respiratory rate/effort  Respiratory rate and effort will be within normal limits for the patient  Outcome: Ongoing    Problem: MECHANICAL VENTILATION  Goal: Patient will maintain patent airway  Outcome: Ongoing  Goal: Oral health is maintained or improved  Outcome: Ongoing  Goal: ET tube will be managed safely  Outcome: Ongoing  Goal: Ability to express needs and understand communication  Outcome: Ongoing  Goal: Mobility/activity is maintained at optimum level for patient  Outcome: Ongoing    Problem: ASPIRATION PRECAUTIONS  Goal: Patient’s risk of aspiration is minimized  Outcome: Ongoing    Problem: SKIN INTEGRITY  Goal: Skin integrity is maintained or improved  Outcome: Ongoing

## 2024-02-27 NOTE — DISCHARGE SUMMARY
[facility]     Department of Internal Medicine - Staff Internal Medicine Teaching Service    INPATIENT DISCHARGE SUMMARY      Patient Identification:  Denny Loomis is a 58 y.o. male.  :  1965  MRN: [id number]     Acct: [id number]   PCP: Gordon Hendrix  Admit Date:  2024  Attending Provider: Dr. Brando Smith    Time of death 1618 on 24                ACTIVE DISCHARGE DIAGNOSES     Hospital Problem Lists:  Principal Problem:    Cardiopulmonary arrest with successful resuscitation (HCC)  Active Problems:    Cardiac arrest (HCC)    ASHANTI (acute kidney injury) (HCC)    Obesity, morbid, BMI 40.0-49.9 (HCC)    Encounter for palliative care    Hyperkalemia    Acute respiratory failure with hypoxia (HCC)  Resolved Problems:    * No resolved hospital problems. *      HOSPITAL STAY     Brief Inpatient course:   Denny Loomis is a Denny Loomis is a 58 y.o. with a history of:  Cellulitis  Tobacco use  Type II DM  Hyperlipidemia  Hypertension  Obstructive sleep apnea  Paroxysmal A-fib  DVT with PE on Xarelto     Patient initially presented to [facility] from [facility] at 1945 patient was unresponsive not breathing densely colored with no pulses CPR was initiated at [facility] EMS was called.  Upon arrival from EMS CPR was initiated to obtain IV access and IO in the left shoulder was obtained a Bidr airway was placed.  Patient received 3 rounds of epinephrine.  Patient was also given Narcan without effect blood sugars were in the 250s.  They were able to achieve ROSC the patient remained unresponsive.     Upon arrival to [place] emergency department bedside echo showed cardiac movement.  Bird airway was switched for an ET tube.  Chest x-ray confirmed above ET tube placement.  Patient blood pressure initially adequate upon arrival however continued to decrease and patient cardiac arrested.  Patient had multiple rounds of CPR [place] and they were able to ROSC.  Patient was

## 2024-02-28 ENCOUNTER — TELEPHONE (OUTPATIENT)
Dept: PULMONOLOGY | Age: 59
End: 2024-02-28

## 2024-02-28 LAB
ANCA MYELOPEROXIDASE: <0.3 AU/ML (ref 0–3.5)
ANCA PROTEINASE 3: <0.7 AU/ML (ref 0–2)
EKG ATRIAL RATE: 77 BPM
EKG P AXIS: 48 DEGREES
EKG P-R INTERVAL: 230 MS
EKG Q-T INTERVAL: 406 MS
EKG QRS DURATION: 140 MS
EKG QTC CALCULATION (BAZETT): 459 MS
EKG R AXIS: 99 DEGREES
EKG T AXIS: 65 DEGREES
EKG VENTRICULAR RATE: 77 BPM
GBM AB SER-ACNC: <1.9 AU/ML (ref 0–7)
MICROORGANISM SPEC CULT: ABNORMAL
MICROORGANISM/AGENT SPEC: ABNORMAL
SPECIMEN DESCRIPTION: ABNORMAL

## 2024-03-01 LAB — NEURON SPEC ENOLASE: 57.2 NG/ML

## 2024-03-01 NOTE — TELEPHONE ENCOUNTER
Death Certificate is on your desk to do. Please advise if its another physician that needs to sign.

## 2024-03-02 LAB
MICROORGANISM SPEC CULT: NORMAL
MICROORGANISM SPEC CULT: NORMAL
SERVICE CMNT-IMP: NORMAL
SERVICE CMNT-IMP: NORMAL
SPECIMEN DESCRIPTION: NORMAL
SPECIMEN DESCRIPTION: NORMAL

## (undated) DEVICE — TOURNIQUET PHLEB L EXSANGUINATING FOR AD DGT TOURNI-COT

## (undated) DEVICE — PRECISION (7.0 X 0.51 X 18.5MM)

## (undated) DEVICE — CLOTH SURG PREP PREOPERATIVE CHLORHEXIDINE GLUC 2% READYPREP

## (undated) DEVICE — SOLUTION IRRIG 1000ML 0.9% SOD CHL USP POUR PLAS BTL

## (undated) DEVICE — SUTURE PROL SZ 3-0 L18IN NONABSORBABLE BLU L19MM PS-2 3/8 8687H

## (undated) DEVICE — SYRINGE MED 50ML LUERLOCK TIP

## (undated) DEVICE — STRAP,POSITIONING,KNEE/BODY,FOAM,4X60": Brand: MEDLINE

## (undated) DEVICE — MHPB HAND AND FOOT PACK: Brand: MEDLINE INDUSTRIES, INC.

## (undated) DEVICE — GLOVE ORANGE PI 8   MSG9080

## (undated) DEVICE — CONTAINER,SPECIMEN,4OZ,OR STRL: Brand: MEDLINE

## (undated) DEVICE — CATHETER IV 14GA L1.25IN TEF STR HUB INTROCAN SFTY

## (undated) DEVICE — APPLICATOR MEDICATED 26 CC SOLUTION HI LT ORNG CHLORAPREP

## (undated) DEVICE — BANDAGE COHESIVE NONSTERILE TAN 1INX5YD CARING

## (undated) DEVICE — DRESSING,GAUZE,XEROFORM,CURAD,1"X8",ST: Brand: CURAD